# Patient Record
Sex: FEMALE | Race: WHITE | NOT HISPANIC OR LATINO | Employment: OTHER | URBAN - METROPOLITAN AREA
[De-identification: names, ages, dates, MRNs, and addresses within clinical notes are randomized per-mention and may not be internally consistent; named-entity substitution may affect disease eponyms.]

---

## 2017-02-14 ASSESSMENT — MIFFLIN-ST. JEOR: SCORE: 1272.46

## 2017-02-16 ENCOUNTER — SURGERY - HEALTHEAST (OUTPATIENT)
Dept: SURGERY | Facility: CLINIC | Age: 67
End: 2017-02-16

## 2017-02-16 ENCOUNTER — ANESTHESIA - HEALTHEAST (OUTPATIENT)
Dept: SURGERY | Facility: CLINIC | Age: 67
End: 2017-02-16

## 2017-02-16 ASSESSMENT — MIFFLIN-ST. JEOR: SCORE: 1281.53

## 2019-01-07 ENCOUNTER — OFFICE VISIT - RIVER FALLS (OUTPATIENT)
Dept: FAMILY MEDICINE | Facility: CLINIC | Age: 69
End: 2019-01-07

## 2019-04-01 ENCOUNTER — TRANSFERRED RECORDS (OUTPATIENT)
Dept: MULTI SPECIALTY CLINIC | Facility: CLINIC | Age: 69
End: 2019-04-01

## 2019-04-01 ENCOUNTER — OFFICE VISIT - RIVER FALLS (OUTPATIENT)
Dept: FAMILY MEDICINE | Facility: CLINIC | Age: 69
End: 2019-04-01

## 2019-04-01 ASSESSMENT — MIFFLIN-ST. JEOR: SCORE: 1327.45

## 2019-04-25 ENCOUNTER — OFFICE VISIT - RIVER FALLS (OUTPATIENT)
Dept: FAMILY MEDICINE | Facility: CLINIC | Age: 69
End: 2019-04-25

## 2019-11-22 ENCOUNTER — OFFICE VISIT - RIVER FALLS (OUTPATIENT)
Dept: FAMILY MEDICINE | Facility: CLINIC | Age: 69
End: 2019-11-22

## 2019-11-22 ASSESSMENT — MIFFLIN-ST. JEOR: SCORE: 1329.27

## 2019-11-25 ENCOUNTER — OFFICE VISIT - RIVER FALLS (OUTPATIENT)
Dept: FAMILY MEDICINE | Facility: CLINIC | Age: 69
End: 2019-11-25

## 2019-11-25 ENCOUNTER — COMMUNICATION - RIVER FALLS (OUTPATIENT)
Dept: FAMILY MEDICINE | Facility: CLINIC | Age: 69
End: 2019-11-25

## 2019-11-25 LAB
BASOPHILS # BLD MANUAL: 0 10*3/UL
BASOPHILS NFR BLD MANUAL: 0 %
BLASTS NFR BLD MANUAL: 0 %
D DIMER PPP FEU-MCNC: 0.67
ELLIPTOCYTES: NORMAL
EOSINOPHIL # BLD MANUAL: 0 10*3/UL
EOSINOPHIL NFR BLD MANUAL: 1 %
ERYTHROCYTE [DISTWIDTH] IN BLOOD BY AUTOMATED COUNT: 14 % (ref 11.5–15.5)
HCT VFR BLD AUTO: 37.1 % (ref 33–51)
HGB BLD-MCNC: 12.3 G/DL (ref 12–16)
LYMPHOCYTES # BLD MANUAL: 2.3 10*3/UL (ref 0.9–2.9)
LYMPHOCYTES NFR BLD MANUAL: 46 %
Lab: PRESENT
MCH RBC QN AUTO: 30.6 PG (ref 26–34)
MCHC RBC AUTO-ENTMCNC: 33.2 G/DL (ref 32–36)
MCV RBC AUTO: 92 FL (ref 80–100)
METAMYELOCYTES NFR BLD MANUAL: 0 %
MONOCYTES # BLD MANUAL: 0.3 10*3/UL
MONOCYTES NFR BLD MANUAL: 6 %
MYELOCYTES NFR BLD MANUAL: 0 %
NEUTROPHILS # BLD MANUAL: 2.3 10*3/UL (ref 1.7–7)
NEUTROPHILS NFR BLD MANUAL: 47 %
OTHER CELLS NFR BLD MANUAL: 0 %
PLASMA CELLS NFR BLD MANUAL: 0 %
PLATELET # BLD AUTO: 201 10*3/UL (ref 140–440)
PMV BLD: 10.3 FL (ref 6.5–11)
PROMYELOCYTES NFR BLD MANUAL: 0 %
RBC # BLD AUTO: 4.02 10*6/UL (ref 4–5.2)
WAM LARGE PLATELETS - HISTORICAL: PRESENT
WBC # BLD AUTO: 4.9 10*3/UL (ref 4.5–11)

## 2020-03-09 ENCOUNTER — COMMUNICATION - RIVER FALLS (OUTPATIENT)
Dept: FAMILY MEDICINE | Facility: CLINIC | Age: 70
End: 2020-03-09

## 2021-05-30 VITALS — BODY MASS INDEX: 25.74 KG/M2 | HEIGHT: 67 IN | WEIGHT: 164 LBS

## 2021-06-08 NOTE — ANESTHESIA POSTPROCEDURE EVALUATION
Patient: Parris Giraldo  FUSION 2ND AND 3RD TARSOMETATARSAL JOINTS AND EXCISION CYST RIGHT FOOT  Anesthesia type: spinal    Patient location: PACU  Last vitals:   Vitals:    02/16/17 1110   BP: 106/61   Pulse: 62   Resp: 16   Temp:    SpO2: 99%     Post vital signs: stable  Level of consciousness: awake and responds to simple questions  Post-anesthesia pain: pain controlled  Post-anesthesia nausea and vomiting: no  Pulmonary: unassisted, return to baseline  Cardiovascular: stable and blood pressure at baseline  Hydration: adequate  Anesthetic events: no    QCDR Measures:  ASA# 11 - Jillian-op Cardiac Arrest: ASA11B - Patient did NOT experience unanticipated cardiac arrest  ASA# 12 - Jillian-op Mortality Rate: ASA12B - Patient did NOT die  ASA# 13 - PACU Re-Intubation Rate: NA - No ETT / LMA used for case  ASA# 10 - Composite Anes Safety: ASA10A - No serious adverse event  ASA# 38 - New Corneal Injury: ASA38A - No new exposure keratitis or corneal abrasion in PACU    Additional Notes:

## 2021-06-08 NOTE — ANESTHESIA CARE TRANSFER NOTE
Last vitals:   Vitals:    02/16/17 1032   BP: 95/52   Pulse: 73   Resp: 16   Temp: 36.6  C (97.8  F)   SpO2: 97%     Patient's level of consciousness is awake  Spontaneous respirations: yes  Maintains airway independently: yes  Dentition unchanged: yes  Oropharynx: oropharynx clear of all foreign objects    QCDR Measures:  ASA# 20 - Surgical Safety Checklist: ASA20A - Safety Checks Done  PQRS# 430 - Adult PONV Prevention: NA - Not adult patient, not GA or 3 or more risk factors NOT present  ASA# 8 - Peds PONV Prevention: NA - Not pediatric patient, not GA or 2 or more risk factors NOT present  PQRS# 424 - Jillian-op Temp Management: 4559F - At least one body temp DOCUMENTED => 35.5C or 95.9F within required timeframe  PQRS# 426 - PACU Transfer Protocol: - Transfer of care checklist used  ASA# 14 - Acute Post-op Pain: ASA14B - Patient did NOT experience pain >= 7 out of 10    I completed my SBAR handoff to the receiving nurse per policy and procedure.

## 2021-06-08 NOTE — ANESTHESIA PROCEDURE NOTES
Peripheral Block    Patient location during procedure: pre-op  Start time: 2/16/2017 8:34 AM  End time: 2/16/2017 8:40 AM  post-op analgesia per surgeon order as noted in medical record  Staffing:  Performing  Anesthesiologist: HORACE LITTLEJOHN  Preanesthetic Checklist  Completed: patient identified, site marked, risks, benefits, and alternatives discussed, timeout performed, consent obtained, airway assessed, oxygen available, suction available, emergency drugs available and hand hygiene performed  Peripheral Block  Block type: sciatic, popliteal  Prep: ChloraPrep  Patient position: sitting  Patient monitoring: cardiac monitor, continuous pulse oximetry, heart rate and blood pressure  Laterality: right  Injection technique: ultrasound guided    Ultrasound used to visualize needle placement in proximity to nerve being blocked: yes   Permanent ultrasound image captured for medical record    Needle  Needle type: Stimuplex   Needle gauge: 21 G  Needle length: 4 in  no peripheral nerve catheter placed  Assessment  Injection assessment: no difficulty with injection, negative aspiration for heme, no paresthesia on injection, transient paresthesias and incremental injection

## 2021-06-08 NOTE — ANESTHESIA PROCEDURE NOTES
Spinal Block    Patient location during procedure: OR  Start time: 2/16/2017 9:05 AM  End time: 2/16/2017 9:09 AM  Reason for block: primary anesthetic    Staffing:  Performing  Anesthesiologist: HORACE LITTLEJOHN    Preanesthetic Checklist  Completed: patient identified, risks, benefits, and alternatives discussed, timeout performed, consent obtained, airway assessed, oxygen available, suction available, emergency drugs available and hand hygiene performed  Spinal Block  Patient position: sitting  Prep: Betadine  Patient monitoring: heart rate, cardiac monitor, continuous pulse ox and blood pressure  Approach: midline  Location: L2-3  Injection technique: single-shot  Needle type: pencil-tip   Needle gauge: 24 G    Assessment  Sensory level: T10

## 2021-06-08 NOTE — ANESTHESIA PREPROCEDURE EVALUATION
Anesthesia Evaluation      Patient summary reviewed   No history of anesthetic complications     Airway   Mallampati: I  Neck ROM: full   Pulmonary - negative ROS and normal exam                          Cardiovascular - negative ROS and normal exam  Exercise tolerance: > or = 10 METS  (-) murmur  Rhythm: regular  Rate: normal,    no murmur      Neuro/Psych - negative ROS     Endo/Other - negative ROS      GI/Hepatic/Renal - negative ROS           Dental - normal exam                        Anesthesia Plan  Planned anesthetic: spinal and peripheral nerve block    ASA 1     Anesthetic plan and risks discussed with: patient and spouse    Post-op plan: routine recovery

## 2021-12-31 ENCOUNTER — COMMUNICATION - RIVER FALLS (OUTPATIENT)
Dept: FAMILY MEDICINE | Facility: CLINIC | Age: 71
End: 2021-12-31

## 2021-12-31 ENCOUNTER — AMBULATORY - RIVER FALLS (OUTPATIENT)
Dept: FAMILY MEDICINE | Facility: CLINIC | Age: 71
End: 2021-12-31

## 2021-12-31 ENCOUNTER — LAB REQUISITION (OUTPATIENT)
Dept: LAB | Facility: CLINIC | Age: 71
End: 2021-12-31
Payer: MEDICARE

## 2021-12-31 ENCOUNTER — OFFICE VISIT - RIVER FALLS (OUTPATIENT)
Dept: FAMILY MEDICINE | Facility: CLINIC | Age: 71
End: 2021-12-31

## 2021-12-31 DIAGNOSIS — U07.1 COVID-19: ICD-10-CM

## 2021-12-31 PROCEDURE — U0003 INFECTIOUS AGENT DETECTION BY NUCLEIC ACID (DNA OR RNA); SEVERE ACUTE RESPIRATORY SYNDROME CORONAVIRUS 2 (SARS-COV-2) (CORONAVIRUS DISEASE [COVID-19]), AMPLIFIED PROBE TECHNIQUE, MAKING USE OF HIGH THROUGHPUT TECHNOLOGIES AS DESCRIBED BY CMS-2020-01-R: HCPCS | Mod: ORL | Performed by: PHYSICIAN ASSISTANT

## 2021-12-31 PROCEDURE — U0005 INFEC AGEN DETEC AMPLI PROBE: HCPCS | Mod: ORL

## 2022-01-01 LAB — SARS-COV-2 RNA RESP QL NAA+PROBE: POSITIVE

## 2022-01-03 ENCOUNTER — COMMUNICATION - RIVER FALLS (OUTPATIENT)
Dept: FAMILY MEDICINE | Facility: CLINIC | Age: 72
End: 2022-01-03

## 2022-01-04 LAB — SARS-COV-2 RNA RESP QL NAA+PROBE: POSITIVE

## 2022-02-06 ENCOUNTER — HEALTH MAINTENANCE LETTER (OUTPATIENT)
Age: 72
End: 2022-02-06

## 2022-02-11 VITALS
SYSTOLIC BLOOD PRESSURE: 126 MMHG | OXYGEN SATURATION: 96 % | TEMPERATURE: 97.7 F | SYSTOLIC BLOOD PRESSURE: 114 MMHG | HEART RATE: 71 BPM | HEIGHT: 67 IN | DIASTOLIC BLOOD PRESSURE: 72 MMHG | TEMPERATURE: 99.1 F | WEIGHT: 175.4 LBS | HEART RATE: 80 BPM | BODY MASS INDEX: 27.53 KG/M2 | DIASTOLIC BLOOD PRESSURE: 60 MMHG | HEIGHT: 67 IN | BODY MASS INDEX: 27.68 KG/M2

## 2022-02-11 VITALS
SYSTOLIC BLOOD PRESSURE: 120 MMHG | TEMPERATURE: 97.8 F | WEIGHT: 175.6 LBS | DIASTOLIC BLOOD PRESSURE: 58 MMHG | HEART RATE: 64 BPM | OXYGEN SATURATION: 97 % | BODY MASS INDEX: 27.92 KG/M2

## 2022-02-11 VITALS
HEART RATE: 60 BPM | TEMPERATURE: 96.9 F | BODY MASS INDEX: 27.47 KG/M2 | DIASTOLIC BLOOD PRESSURE: 84 MMHG | WEIGHT: 175 LBS | SYSTOLIC BLOOD PRESSURE: 120 MMHG | HEIGHT: 67 IN

## 2022-02-16 NOTE — NURSING NOTE
Comprehensive Intake Entered On:  1/7/2019 4:32 PM CST    Performed On:  1/7/2019 4:26 PM CST by Isis Cantu CMA               Summary   Chief Complaint :   cough x8 days, cough is dry, not able to produce phlegm, HA, no sinus problems, no sore throat, no known fever, taking antibiotic but doesnt seem to be helping   Advance Directive :   No   Menstrual Status :   Postmenopausal   Weight Measured :   175.6 lb(Converted to: 175 lb 10 oz, 79.65 kg)    Systolic Blood Pressure :   120 mmHg   Diastolic Blood Pressure :   58 mmHg (LOW)    Mean Arterial Pressure :   79 mmHg   Peripheral Pulse Rate :   64 bpm   BP Site :   Right arm   BP Method :   Manual   Temperature Tympanic :   97.8 DegF(Converted to: 36.6 DegC)  (LOW)    Oxygen Saturation :   97 %   Race :      Languages :   English   Ethnicity :   Not  or    Isis Cantu CMA - 1/7/2019 4:26 PM CST   Health Status   Allergies Verified? :   Yes   Medication History Verified? :   Yes   Medical History Verified? :   No   Pre-Visit Planning Status :   N/A   Tobacco Use? :   Never smoker   Isis Cantu CMA - 1/7/2019 4:26 PM CST   Meds / Allergies   (As Of: 1/7/2019 4:32:06 PM CST)   Allergies (Active)   No known allergies  Estimated Onset Date:   Unspecified ; Created By:   Joleen Machado; Reaction Status:   Active ; Category:   Drug ; Substance:   No known allergies ; Type:   Allergy ; Updated By:   Joleen Machado; Source:   Paper Chart ; Reviewed Date:   3/4/2016 8:58 AM CST        Medication List   (As Of: 1/7/2019 4:32:06 PM CST)   Home Meds    amoxicillin  :   amoxicillin ; Status:   Documented ; Ordered As Mnemonic:   amoxicillin ; Simple Display Line:   Oral, 0 Refill(s) ; Catalog Code:   amoxicillin ; Order Dt/Tm:   1/7/2019 4:29:29 PM

## 2022-02-16 NOTE — TELEPHONE ENCOUNTER
---------------------  From: RACHEL VIVEROS  To: [facility]  Sent: 03/07/2020 07:32 a.m. CST  Subject: my blood test  I had blood test taken 2 days ago and was told the results would be in my portal yesterday.  its not here!!!---------------------  From: Samantha/Geovanna BRASHER (Phone Messages Pool ([id number]_[facility]))   To: RACHEL VIVEROS    Sent: 3/9/2020 8:18:19 AM CDT  Subject: FW: my blood test     Sourav Nye,    Your results are now in. I have attached a copy of them.    Thanks!

## 2022-02-16 NOTE — PROGRESS NOTES
Chief Complaint    F/u cough.  History of Present Illness      Chief complaint as above reviewed and confirmed with patient.  Pt presents to the clinic with concerns re: cough.  Treated 11-22-19 for cough.  That note reviewed.  On day 4 of zithromax.  Cough does not seem any improved.  Keeps up at night, some coughing spells with sob. Chest discomfort with cough only.  NO wheezing.  Has had blood tinged mucus about 10-12 x in the last 2 days.  Difficult time discribing the mucus but not diony blood and not just streaks of blood.  Fevers low grade, nothing greater than 99.  Concerned because her sister is currently undergoing lung cancer treatment.  No orthopnea, pnd, peripheral edema, weight changes or hx of CHF. No hx of COPD and asthma.  Ongoing mild rhinorrhea, congestion.  Review of Systems      Review of systems is negative with the exception of those noted in HPI          Physical Exam   Vitals & Measurements    T: 97.7   F (Tympanic)  HR: 71(Peripheral)  BP: 114/60  SpO2: 96%     HT: 66.75 in           Vitals as above per nursing documentation           Constitutional : nad appears well          Ears: ears patent B, TMS intact, noninjected           Nose: nasal mucosa is non-ededmatous. no discharge           Throat: pharynx is nonerythematous, no tonsillar hypertrophy, no exudate           Neck: neck supple, no adenopathy, no thyromegaly, no rigidity           Lungs: lungs CTA', no Wheezes, rhonchi or rales           Heart: heart RRR, nl S1, S2 no murmur           skin:  No rashes            CBC WNL      D- dimer negative      CXR: unremarkable.   Assessment/Plan       Cough (R05)         reassured pt, discussed it is very possible she is not feeling better due to illness being viral.  Would finish zithromax however discussed that it is also long acting.  TEssalon for cough, fluids, humidification.  FU for persistent or worsening sx.         Ordered:          CBC w/ Diff/Plt (Request), Priority: Urgent,  Cough  Hemoptysis          D-Dimer (Request), Priority: Urgent, Cough  Hemoptysis          XR Chest PA/Lat (Request), Priority: STAT, Cough  Hemoptysis          XR Chest PA/Lat (Request), Cough  Hemoptysis                Hemoptysis (R04.2)         Ordered:          CBC w/ Diff/Plt (Request), Priority: Urgent, Cough  Hemoptysis          D-Dimer (Request), Priority: Urgent, Cough  Hemoptysis          XR Chest PA/Lat (Request), Priority: STAT, Cough  Hemoptysis          XR Chest PA/Lat (Request), Cough  Hemoptysis                Orders:         benzonatate, = 2 cap(s) ( 200 mg ), Oral, tid, x 7 day(s), # 42 cap(s), 1 Refill(s), Type: Acute, Pharmacy: [facility] , 2 cap(s) Oral tid,x7 day(s), (Ordered)  Patient Information     Name:RACHEL VIVEROS      Address:      [address] [id number]     Sex:Female     YOB: 1950     [phone]     Emergency Contact:PEPE VIVEROS     MRN:[id number]     FIN:[id number]     Location:[facility]     Date of Service:11/25/2019      Primary Care Physician:       Martin Vu MDrey, [phone]      Attending Physician:       Sandra Aviles PA-C, [phone]  Problem List/Past Medical History    Ongoing     Disorder of bone, unspecified     Dyslipidemia     Family history of kidney cancer     Full incontinence of feces       Comments: Referral / pelvic floor disorder.     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 Roof of mouth.     Osteoarthritis of knee, unspecified       Comments: Evaluated by Dwight Bianchi MD, on 08/26/2008. Bilateral     Rheumatic Fever       Comments: childhood     SCARLET FEVER       Comments: childhood     Vaginal dryness, menopausal     Varicose veins    Historical     *Hospitalized - TBI     ASCUS on PAP Smear       Comments: Okay on repeat Pap.     Foot neuroma        Comments: Removed from right foot.     Pregnancy     Pregnancy  Procedure/Surgical History     InterStim lead and modulator placement (03/12/2014)           Colonoscopy (03/19/2013)            Comments: Patient had poor prep and should repeat in 5 years with MAC sedation. She should have 2 days of clear lliquids instead of the one day before..     DEXA - Dual energy X-ray photon absorptiometry (02/28/2013)            Comments: Stable mild-mod osteopenia. OK to repeat 5 yrs. (Due 2018).     Rectal prolapse surgery (07/19/2010)            Comments: Dr. Bradshaw..     DEXA - Dual energy X-ray photon absorptiometry (12/14/2009)            Comments: Stable osteopenia. Repeat 3-5 yrs..     Excision of vaginal lesion (11/26/2008)           Posterior repair with prolift (09/03/2008)            Comments: Symptomatic rectocele and enterocele..     Colonoscopy (08/18/2008)           Exercise stress test (11/08/2006)            Comments: Indication:  Neck tightness.  Maternal history of CHF..     Bone density scan (04/21/2006)           Neuroma (10.2003)            Comments: Removed from left foot..     Colonoscopy (2002)            Comments: Repeat 10 yrs. (Due 2012). normal.     Hysterectomy (1997)            Comments: Ovaries remain in place..     Adenoidectomy           Appendectomy           Tonsillectomy        Medications    calcium (as carbonate) 600 mg oral tablet, 1200 mg= 2 tab(s), Oral, daily    Omega-3 oral capsule    Tessalon Perles 100 mg oral capsule, 200 mg= 2 cap(s), Oral, tid, 1 refills    Vitamin D with Minerals oral tablet, 1 tab(s), Oral, daily    Zithromax 250 mg oral tablet, 1 packet(s), Oral, once  Allergies    No Known Medication Allergies  Social History    Smoking Status - 11/25/2019     Never smoker     Alcohol - Denies Alcohol Use, 12/06/2010      Never, 03/04/2016     Employment/School     Exercise - Regular exercise, 12/15/2011      Exercise type: Yoga., 12/06/2010      Exercise frequency: 3  times/week., 12/06/2010     Home/Environment      Marital status:  (Living together). 2 children. Living situation: Home/Independent., 05/02/2019     Substance Abuse - Denies Substance Abuse, 12/15/2011      Never, 03/04/2016     Tobacco      Never, 06/19/2014  Family History    Bone cancer.: Father.    CA - Cancer: Father and Sister.    CA - Cancer of colon: Father.    CHF - Congestive heart failure: Mother.    Cancer: Father and Sister.    Congestive Heart Failure: Mother.    Diabetes: Grandmother (P).    Diabetes mellitus: Grandmother (P).Negative: Grandfather (M).    Kidney cancer, primary, with metastasis from kidney to other site.: Father and Sister.    Kidney failure.: Father.    Daughter: History is negative    Brother: History is negative    Brother: History is negative    Sister: History is negative  Immunizations      Vaccine Date Status      influenza virus vaccine, inactivated 03/04/2016 Given      tetanus/diphth/pertuss (Tdap) adult/adol 03/04/2016 Recorded      influenza 10/17/2015 Recorded      pneumococcal (PCV13) 03/03/2015 Given      pneumococcal (PPSV23) 02/27/2014 Given      influenza virus vaccine, inactivated 12/24/2013 Given      influenza virus vaccine, inactivated 12/31/2008 Recorded      influenza 12/01/2008 Recorded      ZOS, shingles 11/19/2007 Recorded      Td 04/07/2006 Recorded  Lab Results       Lab Results (Last 4 results within 90 days)        WBC: 4.9 [4.5  - 11] (11/25/19 14:13:00)       RBC: 4.02 [4  - 5.2] (11/25/19 14:13:00)       Hgb: 12.3 [12 g/dL - 16 g/dL] (11/25/19 14:13:00)       Hct: 37.1 [33 % - 51 %] (11/25/19 14:13:00)       MCV: 92 [80 fL - 100 fL] (11/25/19 14:13:00)       MCH: 30.6 [26 pg - 34 pg] (11/25/19 14:13:00)       MCHC: 33.2 [32 g/dL - 36 g/dL] (11/25/19 14:13:00)       RDW: 14.0 [11.5 % - 15.5 %] (11/25/19 14:13:00)       Platelet: 201 [140  - 440] (11/25/19 14:13:00)       MPV: 10.3 [6.5 fL - 11 fL] (11/25/19 14:13:00)       Metamyelocytes Man:  0.0 (11/25/19 14:13:00)       Myelocytes Man: 0.0 (11/25/19 14:13:00)       Promyelocytes Man: 0.0 (11/25/19 14:13:00)       Blasts Man: 0.0 (11/25/19 14:13:00)       Other Cells Man: 0.0 (11/25/19 14:13:00)       Lymphocytes: 46.0 (11/25/19 14:13:00)       Abs Lymphocytes: 2.3 [0.9  - 2.9] (11/25/19 14:13:00)       Neutrophils: 47.0 (11/25/19 14:13:00)       Abs Neutrophils: 2.3 [1.7  - 7] (11/25/19 14:13:00)       Monocytes: 6.0 (11/25/19 14:13:00)       Abs Monocytes: 0.3 (11/25/19 14:13:00)       Eosinophils: 1.0 (11/25/19 14:13:00)       Abs Eosinophils: 0.0 (11/25/19 14:13:00)       Basophils: 0.0 (11/25/19 14:13:00)       Abs Basophils: 0.0 (11/25/19 14:13:00)       Plasma Cells: 0.0 (11/25/19 14:13:00)       Platelet Estimate: Adequate (11/25/19 14:13:00)       Vacuolated Granulocytes: Present (11/25/19 14:13:00)       Elliptocytes: Few (11/25/19 14:13:00)       RBC Comments: Present (11/25/19 14:13:00)       Plt Large: Present (11/25/19 14:13:00)       D-Dimer: 0.67 (11/25/19 14:13:00)

## 2022-02-16 NOTE — NURSING NOTE
Comprehensive Intake Entered On:  11/25/2019 1:41 PM CST    Performed On:  11/25/2019 1:37 PM CST by Nia Mukherjee               Summary   Chief Complaint :   F/u cough.    Advance Directive :   No   Menstrual Status :   Postmenopausal   Height Measured :   66.75 in(Converted to: 5 ft 7 in, 169.54 cm)    Ht/Wt Measurement Refused by Patient? :   Yes   Systolic Blood Pressure :   114 mmHg   Diastolic Blood Pressure :   60 mmHg   Mean Arterial Pressure :   78 mmHg   Peripheral Pulse Rate :   71 bpm   Temperature Tympanic :   97.7 DegF(Converted to: 36.5 DegC)  (LOW)    Oxygen Saturation :   96 %   Race :      Languages :   English   Ethnicity :   Not  or    Nia Mukherjee - 11/25/2019 1:37 PM CST   Health Status   Allergies Verified? :   Yes   Medication History Verified? :   Yes   Medical History Verified? :   Yes   Pre-Visit Planning Status :   Completed   Tobacco Use? :   Never smoker   Nia Mukherjee - 11/25/2019 1:37 PM CST   Meds / Allergies   (As Of: 11/25/2019 1:41:29 PM CST)   Allergies (Active)   No Known Medication Allergies  Estimated Onset Date:   Unspecified ; Created By:   Callie Valdivia CMA; Reaction Status:   Active ; Category:   Drug ; Substance:   No Known Medication Allergies ; Type:   Allergy ; Updated By:   Callie Valdivia CMA; Reviewed Date:   11/25/2019 1:39 PM CST        Medication List   (As Of: 11/25/2019 1:41:29 PM CST)   Prescription/Discharge Order    azithromycin  :   azithromycin ; Status:   Prescribed ; Ordered As Mnemonic:   Zithromax 250 mg oral tablet ; Simple Display Line:   1 packet(s), PO, Once, as directed on package labeling. Two tablets po on day one, then one tablet daily x four days, 6 tab(s), 0 Refill(s) ; Ordering Provider:   Donte Rai PA-C; Catalog Code:   azithromycin ; Order Dt/Tm:   11/22/2019 2:37:24 PM CST            Home Meds    calcium carbonate  :   calcium carbonate ; Status:   Documented ; Ordered As Mnemonic:    calcium (as carbonate) 600 mg oral tablet ; Simple Display Line:   1,200 mg, 2 tab(s), Oral, daily, 0 Refill(s) ; Catalog Code:   calcium carbonate ; Order Dt/Tm:   4/1/2019 10:06:19 AM CDT          multivitamin with minerals  :   multivitamin with minerals ; Status:   Documented ; Ordered As Mnemonic:   Vitamin D with Minerals oral tablet ; Simple Display Line:   1 tab(s), Oral, daily, 0 Refill(s) ; Catalog Code:   multivitamin with minerals ; Order Dt/Tm:   4/1/2019 10:06:08 AM CDT          omega-3 polyunsaturated fatty acids  :   omega-3 polyunsaturated fatty acids ; Status:   Documented ; Ordered As Mnemonic:   Omega-3 oral capsule ; Simple Display Line:   0 Refill(s) ; Catalog Code:   omega-3 polyunsaturated fatty acids ; Order Dt/Tm:   4/1/2019 10:06:02 AM CDT

## 2022-02-16 NOTE — TELEPHONE ENCOUNTER
"---------------------From: Susana Pederson CMA (Phone Messages Pool ([address] [place] [address])) To: [facility] Message Pool ([address] [place] [address]);   Sent: 11/25/2019 9:35:28 AM CSTSubject: General Message-worsening cough Phone MessagePCP:   [employer] Time of Call:  0850   Person Calling:  selfPhone number:  [phone]:   pt seen Friday by CARLA for a \"horrible cough\", states now much deeper and keeping her up at night, is coughing up tinges of blood and woke up 4times last night with coughing, no energy.  CARLA rx'd Z-eduardo for bronchitis on Friday.  Wondering if she should be rechecked, wait it out more, change rx??  Please adviseLast office visit and reason:  11/22---------------------From: Xochitl Flores LPN ([facility] Message Pool ([address] [place] [address])) To: Joey Vu MD;   Sent: 11/25/2019 10:10:06 AM CSTSubject: FW: General Message-worsening cough---------------------From: Joey Vu MD To: [employer] Message Pool ([address] [place] [address]);   Sent: 11/25/2019 10:50:30 AM CSTSubject: RE: General Message-worsening cough Needs to be seen.Called pt and informed of message. transferred to scheduling  "

## 2022-02-16 NOTE — TELEPHONE ENCOUNTER
---------------------  From: Kinsey Singh CMA (Phone Messages Pool ([id number]_[facility]))   To: [facility] Message Pool ([address]_[place] [facility] [place]);     Sent: 10/7/2019 4:49:00 PM CDT  Subject: General Message     Phone Message    PCP:   ERNESTO      Time of Call:  4:17       Person Calling:  Patient  Phone number:  [phone]    Note:  Patient is traveling has has broke out with poison ivy.    Is it possible to get a prescription?    [facility]  [address] [place] [address]    [phone]      Transferred to: ERNESTO---------------------  From: Geovanna Villalpando ([facility] Message Pool ([id number]_[place] [facility] [place]))   To: Joey Vu MD;     Sent: 10/7/2019 4:51:13 PM CDT  Subject: FW: General Message

## 2022-02-16 NOTE — LETTER
(Inserted Image. Unable to display)   July 10, 2020      RACHEL VIVEROS   [address] [place], [place] [id number]        Dear RACHEL,      Thank you for selecting [facility] (previously [facility]) for your healthcare needs.     Our records indicate you are due for the following services:     Annual Physical    To schedule an appointment or if you have further questions, please contact your primary clinic:   [facility]          [phone]   [facility]    [phone]             [facility]         [phone]      Powered by [employer]    Sincerely,    Rashad Carrington M.D.

## 2022-02-16 NOTE — PROGRESS NOTES
Patient:   RACHEL VIVEROS            MRN: [id number]            FIN: [id number]               Age:   69 years     Sex:  Female     :  1950   Associated Diagnoses:   Osteopenia; Medicare annual wellness visit, subsequent   Author:   Rashad Carrington MD      Visit Information      Primary Care Provider (PCP):  Joey Vu MD# [id number]      Chief Complaint   2019 10:04 AM CDT    AWV   Here for annual wellness physical for Medicare      History of Present Illness     Current medical providers reviewed with patient updated on demographics in chart as listed on the patient health history form.  Patient notes that she usually goes to [facility] but was told that AWV was not an option there. Previously had followed with Yessy Velez at our [place] site. Generally is very healthy. No chronic medications. No current concerns.  Depression screening completed and history reviewed with patient, no concerns.  CAGE reviewed with patient, no concerns.  Functional ability/Health Risk assessment reviewed:   Hearing impairment - denies concerns   Activities of daily living - independent without concerns   Fall risks - denies falls in past year, no assistance required with walking or transfer   Home safety issues reviewed, no concerns raised.  Cognitive impairment evaluated, patient oriented to year, date, location, self.  No deficits notes.  Cognitive screening and dementia symptoms reviewed.  Advanced care planning discussed.  Patient is welcome to provide paperwork to us so that we can copy into electronic medical record.  Preventive services reviewed and documented on preventive services checklist.  Past history reviewed and confirmed as noted below.       Review of Systems   ROS reviewed as documented in chart      Health Status   Allergies:    Allergic Reactions (Selected)  No Known Medication Allergies   Medications:  (Selected)   Documented Medications  Documented  Omega-3 oral capsule: 0  Refill(s), Type: Maintenance  Vitamin D with Minerals oral tablet: 1 tab(s), Oral, daily, 0 Refill(s), Type: Maintenance  calcium (as carbonate) 600 mg oral tablet: = 2 tab(s) ( 1,200 mg ), Oral, daily, 0 Refill(s), Type: Maintenance   Problem list:    All Problems  Dyslipidemia / ICD-9-.4 / Confirmed  Family history of kidney cancer / ICD-9-CM V16.51 / Confirmed  Fecal incontinence / ICD-9-.6 / Confirmed  Varicose veins / SNOMED CT [id number] / Confirmed  History of traumatic brain injury / SNOMED CT 2031003697 / Confirmed  OA (Osteoarthritis) of Knee / ICD-9-.96 / Confirmed  Osteopenia / ICD-9-.90 / Confirmed  Vaginal dryness, menopausal / SNOMED CT 63641891 / Confirmed  Inactive: Leukoplakia / SNOMED CT 30353295  Inactive: Rheumatic Fever / ICD-9-  Inactive: SCARLET FEVER / ICD-9-.1  Resolved: *Hospitalized - TBI  Resolved: ASCUS on PAP Smear / ICD-9-.01  Resolved: Foot neuroma / ICD-9-.3      Histories   Past Medical History:    Active  History of traumatic brain injury (SNOMED CT 7992310317): Onset on 7/23/2016 at 66 years.  Fecal incontinence (ICD-9-.6): Onset in 2004 at 53 years.  Comments:  2/21/2013 CST 11:45 AM Susana Diaz  Referral / pelvic floor disorder.  Osteopenia (ICD-9-.90): Onset in 2003 at 52 years.  Dyslipidemia (ICD-9-.4)  OA (Osteoarthritis) of Knee (ICD-9-.96)  Comments:  2/21/2013 CST 11:50 AM Susana Diaz  Bilateral    2/21/2013 CST 11:58 AM Susana Diaz  Evaluated by Dwight Bianchi MD, on 08/26/2008.  Resolved  *Hospitalized - TBI: Onset on 7/23/2016 at 66 years.  Resolved on 7/24/2016 at 66 years.  Foot neuroma (ICD-9-.3): Onset in 2003 at 52 years.  Resolved.  Comments:  2/21/2013 CST 11:41 AM CST - Susana Eaton  Removed from right foot.  ASCUS on PAP Smear (ICD-9-.01): Onset in the month of 2/2001 at 50 years  Resolved.  Comments:  2/21/2013 CST 11:43 AM CST -  Susana Eaton  Okay on repeat Pap.   Family History:    Diabetes mellitus  Grandmother (P)  CA - Cancer  Father (Sander, )  Sister  CA - Cancer of colon  Father (Sander, )  CHF - Congestive heart failure  Mother (Tammi, )  Cancer  Sister  Comments:  2013 11:37 AM Susana Diaz  One sister has CA of kidney.  Father (Sander, )  Comments:  2013 11:37 AM Susana Diaz  CA bones / colon  Diabetes  Grandmother (P)  Comments:  2013 11:38 AM Susana Diaz  DM  Congestive Heart Failure  Mother (Tammi, )  Bone cancer.  Father (Sander, )  Kidney failure.  Father (Sander, )  Kidney cancer, primary, with metastasis from kidney to other site.  Father (Sander, )  Sister  Comments:  3/26/2013 6:09 PM CDT - Dora Joseph CMA  1 kidney removed at age 53     Procedure history:    InterStim lead and modulator placement on 3/12/2014 at 64 Years.  Colonoscopy ([id number]) on 3/19/2013 at 63 Years.  Comments:  3/28/2013 3:19 PM CDT - Tesfaye Ortiz MA  Patient had poor prep and should repeat in 5 years with MAC sedation. She should have 2 days of clear lliquids instead of the one day before.  DEXA - Dual energy X-ray photon absorptiometry (523254291) on 2013 at 63 Years.  Comments:  3/19/2013 11:48 AM IKER - Yessy Reza  Stable mild-mod osteopenia. OK to repeat 5 yrs. (Due 2018)  Rectal prolapse surgery on 2010 at 60 Years.  Comments:  10/28/2010 4:27 PM JOHNSONT - Yessy Reza Dr..  DEXA - Dual energy X-ray photon absorptiometry (731310328) on 2009 at 59 Years.  Comments:  2010 8:37 AM Yessy South  Stable osteopenia. Repeat 3-5 yrs.  Excision of vaginal lesion on 2008 at 58 Years.  Posterior repair with prolift on 9/3/2008 at 58 Years.  Comments:  2013 11:56 AM MIAN Eaton Susana  Symptomatic rectocele and enterocele.  Colonoscopy ([id number]) on 2008 at 58 Years.  Exercise  stress test (476240062) on 2006 at 56 Years.  Comments:  2013 12:02 PM Susana Diaz  Indication:  Neck tightness.  Maternal history of CHF.  Bone density scan (703530479) on 2006 at 56 Years.  Neuroma (215.9) in the month of 10/2003 at 53 Years.  Comments:  2013 11:40 AM Susana Diaz  Removed from left foot.  Colonoscopy ([id number]) in  at 52 Years.  Comments:  2010 8:36 AM CST - Monisha JIMÉNEZ-C, Yessy  Repeat 10 yrs. (Due )    2010 3:38 PM CDT - Joleen Machado  normal  Hysterectomy in  at 47 Years.  Comments:  2013 11:51 AM Susana Diaz  Ovaries remain in place.  Tonsillectomy (850750392).  Adenoidectomy (755412613).  Appendectomy (028613743).   x2.  Comments:  2013 11:39 AM Susana Diaz  1968 and 1970   Social History:        Alcohol Assessment: Denies Alcohol Use            Never      Tobacco Assessment            Never      Substance Abuse Assessment: Denies Substance Abuse            Never      Employment and Education Assessment                     Comments:                      2013 - Dora Joseph CMA                     Homemaker      Home and Environment Assessment            Marital status:  (Living together).  2 children.                     Comments:                      12/15/2011 - Dora Joseph CMA                      - Josep White      Exercise and Physical Activity Assessment: Regular exercise            Exercise type: Yoga.            Exercise frequency: 3 times/week.                     Comments:                      2010 - Dora Joseph CMA                     Strength classes 2x/wk      Physical Examination   Vital Signs   2019 10:04 AM CDT Temperature Tympanic 96.9 DegF  LOW    Peripheral Pulse Rate 60 bpm    Pulse Site Radial artery    HR Method Manual    Systolic Blood Pressure 120 mmHg    Diastolic Blood Pressure 84 mmHg  HI    Mean Arterial Pressure 96 mmHg    BP Site Right arm     BP Method Manual      Measurements from flowsheet : Measurements   4/1/2019 10:04 AM CDT Height Measured - Standard 66.75 in    Weight Measured - Standard 175 lb    BSA 1.93 m2    Body Mass Index 27.61 kg/m2  HI      General:  Alert and oriented, No acute distress.    Eye:  Pupils are equal, round and reactive to light, Extraocular movements are intact, Normal conjunctiva.    HENT:  Normocephalic, Tympanic membranes are clear, Oral mucosa is moist, No pharyngeal erythema.    Neck:  Supple, Non-tender, No lymphadenopathy, No thyromegaly.    Respiratory:  Lungs are clear to auscultation.    Cardiovascular:  Normal rate, Regular rhythm.    Breast:  No mass, No tenderness, No discharge.    Gastrointestinal:  Soft, Non-tender, Non-distended, No organomegaly.    Musculoskeletal:  Normal range of motion, Normal strength.    Integumentary:  Warm, Dry, Pink, No rash, no concerning lesions.    Neurologic:  Alert, Oriented, Normal sensory.    Psychiatric:  Cooperative, Appropriate mood & affect.       Impression and Plan   Diagnosis     Osteopenia (PQN43-KH M85.80).     Medicare annual wellness visit, subsequent (ZKT43-GF Z00.00).     Course:  Progressing as expected.    Plan:  had recent DEXA, will request her [place] records for us to have going forward, follow up with us in one year, sooner as needed. Doing DALT fasting labs tomorrow and will bring us a copy for her chart. No other concerns at this time..    Patient Instructions:       Counseled: Patient, Regarding diagnosis, Regarding treatment, Regarding medications, Diet, Activity, Prognosis/ end-of-life issues, BMI, exercise, healthy eating, home safety, depression.    Summary:  Preventive services recommended as noted on preventive services checklist..

## 2022-02-16 NOTE — PROGRESS NOTES
Patient:   RACHEL VIVEROS            MRN: [id number]            FIN: [id number]               Age:   68 years     Sex:  Female     :  1950   Associated Diagnoses:   Bronchitis   Author:   Yamila Stern      Visit Information      Date of Service: 2019 04:20 pm  Performing Location: [facility]  Encounter#: [id number]      Primary Care Provider (PCP):  Joey Vu MD    NPI# [id number]      Referring Provider:  Yamila Stern    NPI# [id number]      Chief Complaint   2019 4:26 PM CST     cough x8 days, cough is dry, not able to produce phlegm, HA, no sinus problems, no sore throat, no known fever, taking antibiotic but doesnt seem to be helping        History of Present Illness   reviewed presenting problem as above with patient  She generally is very healthy--no hx lung disease or pneumonia or asthma. Is a non smoker  She had someone prescribe Amoxicillin to her for this severe cough, has had about 4 days, not helping, was not evaluated.  She has no SOB or fever or associated symtpoms, just the cough  She used Mucinex and Robitussin and cough kept her up all night      Review of Systems   Constitutional:  No fever, No chills.    Ear/Nose/Mouth/Throat:  No ear pain, No nasal congestion, No sore throat.    Respiratory:  No shortness of breath, No wheezing.    Gastrointestinal:  No nausea, No vomiting, No diarrhea.              Health Status   Allergies:    Allergic Reactions (Selected)  No known allergies   Medications:  (Selected)   Prescriptions  Prescribed  codeine-guaifenesin 10 mg-100 mg/5 mL oral syrup: 5 mL, PO, q6 hrs, Instructions: not to exceed 12 teaspoons/day, PRN: for cough, # 240 mL, 0 Refill(s), Type: Maintenance, Pharmacy: [id number], 5 mL Oral q6 hrs,x5 day(s),PRN:for cough,Instr:not to exceed 12 teaspoons/day  doxycycline monohydrate 100 mg oral capsule: = 1 cap(s) ( 100 mg ), Oral, bid, # 20 cap(s), 0 Refill(s), Type: Maintenance, Pharmacy:  [id number], 1 cap(s) Oral bid,x10 day(s)  predniSONE 20 mg oral tablet: = 1 tab(s) ( 20 mg ), PO, bid, Instructions: with food or milk, second dose before 3 pm, # 10 tab(s), 0 Refill(s), Type: Maintenance, Pharmacy: [id number], 1 tab(s) Oral bid,x5 day(s),Instr:with food or milk, second dose before 3 pm  Documented Medications  Documented  amoxicillin: Oral, 0 Refill(s), Type: Maintenance   Problem list:    All Problems  Dyslipidemia / ICD-9-.4 / Confirmed  Osteopenia / ICD-9-.90 / Confirmed  Fecal incontinence / ICD-9-.6 / Confirmed  Referral / pelvic floor disorder.  OA (Osteoarthritis) of Knee / ICD-9-.96 / Confirmed  Bilateral  Evaluated by Dwight Bianchi MD, on 08/26/2008.  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  Roof of mouth.  Resolved: Foot neuroma / ICD-9-.3  Removed from right foot.  Resolved: ASCUS on PAP Smear / ICD-9-.01  Okay on repeat Pap.  Resolved: *Hospitalized - TBI      Histories   Past Medical History:    Active  History of traumatic brain injury (4004391706): Onset on 7/23/2016 at 66 years.  Fecal incontinence (787.6): Onset in 2004 at 53 years.  Comments:  2/21/2013 CST 11:45 AM CST - Susana Eaton  Referral / pelvic floor disorder.  Osteopenia (733.90): Onset in 2003 at 52 years.  Dyslipidemia (272.4)  OA (Osteoarthritis) of Knee (715.96)  Comments:  2/21/2013 CST 11:50 AM Susana Diaz  Bilateral    2/21/2013 CST 11:58 AM Susana Diaz  Evaluated by Dwight Bianchi MD, on  2008.  Resolved  *Hospitalized - TBI: Onset on 2016 at 66 years.  Resolved on 2016 at 66 years.  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  Resolved.  Comments:  2013 CST 11:41 AM MIAN Eaton Susana  Removed from right foot.  ASCUS on PAP Smear (795.01): Onset in the month of 2001 at 50 years  Resolved.  Comments:  2013 CST 11:43 AM Susana Diaz  Okay on repeat Pap.   Family History:    Diabetes mellitus  Grandmother (P)  CA - Cancer  Father (Sander, )  Sister  CA - Cancer of colon  Father (Sander, )  CHF - Congestive heart failure  Mother (Tammi, )  Cancer  Sister  Comments:  2013 11:37 AM Susana Diaz  One sister has CA of kidney.  Father (Sander, )  Comments:  2013 11:37 AM Susana Diaz  CA bones / colon  Diabetes  Grandmother (P)  Comments:  2013 11:38 AM Susana Diaz  DM  Congestive Heart Failure  Mother (Tammi, )  Bone cancer.  Father (Sander, )  Kidney failure.  Father (Sander, )  Kidney cancer, primary, with metastasis from kidney to other site.  Father (Sander, )  Sister  Comments:  3/26/2013 6:09 PM CDT - Dora Joseph CMA  1 kidney removed at age 53     Procedure history:    InterStim lead and modulator placement performed by Kevin Brewer DO on 3/12/2014 at 64 Years.  Colonoscopy (SNOMED CT 796612770) performed by Dheeraj Howard MD on 3/19/2013 at 63 Years.  Comments:  3/28/2013 3:19 PM CDT - Tesfaye Ortiz MA  Patient had poor prep and should repeat in 5 years with MAC sedation. She should have 2 days of clear lliquids instead of the one day before.  DEXA - Dual energy X-ray photon absorptiometry (SNOMED CT 214445121) on 2013 at 63 Years.  Comments:  3/19/2013 11:48 AM CDT - Yessy Reza  Stable mild-mod osteopenia. OK to repeat 5 yrs. (Due 2018)  Rectal prolapse surgery on 2010 at 60 Years.  Comments:  10/28/2010 4:27 PM IKER - Monisha WISEMAN,  Yessy Bradshaw.  DEXA - Dual energy X-ray photon absorptiometry (SNOMED CT 900640733) on 2009 at 59 Years.  Comments:  2010 8:37 AM Yessy South  Stable osteopenia. Repeat 3-5 yrs.  Excision of vaginal lesion on 2008 at 58 Years.  Posterior repair with prolift on 9/3/2008 at 58 Years.  Comments:  2013 11:56 AM Susana Diaz  Symptomatic rectocele and enterocele.  Colonoscopy (SNOMED CT 117787375) performed by Dheeraj Howard MD on 2008 at 58 Years.  Exercise stress test (SNOMED CT 686632127) on 2006 at 56 Years.  Comments:  2013 12:02 PM Susana Diaz  Indication:  Neck tightness.  Maternal history of CHF.  Bone density scan (SNOMED CT 558281630) on 2006 at 56 Years.  Neuroma (ICD-9-.9) in the month of 10/2003 at 53 Years.  Comments:  2013 11:40 AM Susana Diaz  Removed from left foot.  Colonoscopy (SNOMED CT 316355187) in  at 52 Years.  Comments:  2010 8:36 AM Yessy South  Repeat 10 yrs. (Due )    2010 3:38 PM CDT - Joleen Machado  normal  Hysterectomy in  at 47 Years.  Comments:  2013 11:51 AM Susana Diaz  Ovaries remain in place.  Tonsillectomy (SNOMED CT 092567556).  Adenoidectomy (SNOMED CT 023865032).  Appendectomy (SNOMED CT 578159878).   x2.  Comments:  2013 11:39 AM Susana Diaz  1968 and 1970   Social History:        Alcohol Assessment: Denies Alcohol Use            Never      Tobacco Assessment            Never      Substance Abuse Assessment: Denies Substance Abuse            Never      Employment and Education Assessment                     Comments:                      2013 - Dora Joseph CMA                     Homemaker      Home and Environment Assessment            Marital status:  (Living together).  2 children.                     Comments:                      12/15/2011 - Dora Joseph CMA                      - Josep  Enzo      Exercise and Physical Activity Assessment: Regular exercise            Exercise type: Yoga.            Exercise frequency: 3 times/week.                     Comments:                      12/06/2010 - JakeDora wolff CMA                     Strength classes 2x/wk      Physical Examination   Vital Signs   1/7/2019 4:26 PM CST Temperature Tympanic 97.8 DegF  LOW    Peripheral Pulse Rate 64 bpm    Systolic Blood Pressure 120 mmHg    Diastolic Blood Pressure 58 mmHg  LOW    Mean Arterial Pressure 79 mmHg    BP Site Right arm    BP Method Manual    Oxygen Saturation 97 %      Measurements from flowsheet : Measurements   1/7/2019 4:26 PM CST     Weight Measured - Standard                175.6 lb     General:  Alert and oriented, No acute distress.    Eye:  Normal conjunctiva.    HENT:  Tympanic membranes are clear, Normal hearing, Oral mucosa is moist, No pharyngeal erythema, No sinus tenderness.    Neck:  Supple, Non-tender, No lymphadenopathy.    Respiratory:  Respirations are non-labored, Symmetrical chest wall expansion, course breath sounds bilat with some wheezes as well  harsh barking cough.    Cardiovascular:  Normal rate, Regular rhythm, No murmur.    Musculoskeletal:  Normal range of motion, Normal gait.    Integumentary:  Warm, Dry, Pink, No rash.    Neurologic:  Alert, Oriented.    Psychiatric:  Cooperative.       Review / Management   Radiology results   X-ray, X-ray reviewed with patient, no abnormality noted.  Will await radiology over-read and if differs such that treatment would be altered,  will notify patient.    Course:  increased air movement after albuterol neb.       Impression and Plan   Diagnosis     Bronchitis (CAN79-UP J40).     Patient Instructions:       Counseled: Patient, Regarding diagnosis, Regarding treatment, Regarding medications, Verbalized understanding, Counseled on symptomatic management. Return to clinic for re evaluation if worsening, simply not improving, or failure to  resolve.   .    Orders     Orders (Selected)   Prescriptions  Prescribed  codeine-guaifenesin 10 mg-100 mg/5 mL oral syrup: 5 mL, PO, q6 hrs, Instructions: not to exceed 12 teaspoons/day, PRN: for cough, # 240 mL, 0 Refill(s), Type: Maintenance, Pharmacy: [id number], 5 mL Oral q6 hrs,x5 day(s),PRN:for cough,Instr:not to exceed 12 teaspoons/day  doxycycline monohydrate 100 mg oral capsule: = 1 cap(s) ( 100 mg ), Oral, bid, # 20 cap(s), 0 Refill(s), Type: Maintenance, Pharmacy: [id number], 1 cap(s) Oral bid,x10 day(s)  predniSONE 20 mg oral tablet: = 1 tab(s) ( 20 mg ), PO, bid, Instructions: with food or milk, second dose before 3 pm, # 10 tab(s), 0 Refill(s), Type: Maintenance, Pharmacy: [id number], 1 tab(s) Oral bid,x5 day(s),Instr:with food or milk, second dose before 3 pm.

## 2022-02-16 NOTE — NURSING NOTE
Comprehensive Intake Entered On:  11/22/2019 2:24 PM CST    Performed On:  11/22/2019 2:20 PM CST by Radha Dias MA               Summary   Chief Complaint :   Cough, congestion, body aches x 3 days    Advance Directive :   No   Menstrual Status :   Postmenopausal   Weight Measured :   175.4 lb(Converted to: 175 lb 6 oz, 79.56 kg)    Height Measured :   66.75 in(Converted to: 5 ft 7 in, 169.54 cm)    Body Mass Index :   27.67 kg/m2 (HI)    Body Surface Area :   1.93 m2   Systolic Blood Pressure :   126 mmHg   Diastolic Blood Pressure :   72 mmHg   Mean Arterial Pressure :   90 mmHg   Peripheral Pulse Rate :   80 bpm   BP Site :   Left arm   Pulse Site :   Radial artery   BP Method :   Manual   HR Method :   Manual   Temperature Tympanic :   99.1 DegF(Converted to: 37.3 DegC)    Race :      Languages :   English   Ethnicity :   Not  or    Radha Dias MA - 11/22/2019 2:20 PM CST   Health Status   Allergies Verified? :   Yes   Medication History Verified? :   Yes   Medical History Verified? :   Yes   Pre-Visit Planning Status :   Completed   Tobacco Use? :   Never smoker   Radha Dias MA - 11/22/2019 2:20 PM CST   Consents   Consent for Immunization Exchange :   Consent Granted   Consent for Immunizations to Providers :   Consent Granted   Radha Dias MA - 11/22/2019 2:20 PM CST   Meds / Allergies   (As Of: 11/22/2019 2:24:20 PM CST)   Allergies (Active)   No Known Medication Allergies  Estimated Onset Date:   Unspecified ; Created By:   Callie Valdivia CMA; Reaction Status:   Active ; Category:   Drug ; Substance:   No Known Medication Allergies ; Type:   Allergy ; Updated By:   Callie Valdivia CMA; Reviewed Date:   11/22/2019 2:22 PM CST        Medication List   (As Of: 11/22/2019 2:24:20 PM CST)   Home Meds    calcium carbonate  :   calcium carbonate ; Status:   Documented ; Ordered As Mnemonic:   calcium (as carbonate) 600 mg oral tablet ; Simple Display Line:   1,200 mg, 2 tab(s),  Oral, daily, 0 Refill(s) ; Catalog Code:   calcium carbonate ; Order Dt/Tm:   4/1/2019 10:06:19 AM CDT          multivitamin with minerals  :   multivitamin with minerals ; Status:   Documented ; Ordered As Mnemonic:   Vitamin D with Minerals oral tablet ; Simple Display Line:   1 tab(s), Oral, daily, 0 Refill(s) ; Catalog Code:   multivitamin with minerals ; Order Dt/Tm:   4/1/2019 10:06:08 AM CDT          omega-3 polyunsaturated fatty acids  :   omega-3 polyunsaturated fatty acids ; Status:   Documented ; Ordered As Mnemonic:   Omega-3 oral capsule ; Simple Display Line:   0 Refill(s) ; Catalog Code:   omega-3 polyunsaturated fatty acids ; Order Dt/Tm:   4/1/2019 10:06:02 AM CDT

## 2022-02-16 NOTE — PROGRESS NOTES
Patient:   RACHEL VIVEROS            MRN: [id number]            FIN: [id number]               Age:   69 years     Sex:  Female     :  1950   Associated Diagnoses:   Bronchitis   Author:   Donte Rai PA-C      Report Summary   Diagnosis  Bronchitis ([id number] J40).  Patient InstructionsOrders   Visit Information      Date of Service: 2019 02:06 pm  Performing Location: [facility]  Encounter#: [id number]      Primary Care Provider (PCP):  Joey Vu MD    NPI# [id number]      Referring Provider:  Donte Rai PA-C    NPI# [id number]   Visit type:  New symptom.    Source of history:  Self, Medical record.    History limitation:  None.       Chief Complaint   2019 2:20 PM CST   Cough, congestion, body aches x 3 days        History of Present Illness             The patient presents with cough.  The severity of the cough is moderate.  The cough is constant.  The cough has lasted for 4 day(s).  Associated symptoms consist of nasal congestion.        Review of Systems   Eye:  Negative.    Ear/Nose/Mouth/Throat:  Negative except as documented in history of present illness.    Respiratory:  Negative except as documented in history of present illness.       Health Status   Allergies:    Allergic Reactions (All)  No Known Medication Allergies  Canceled/Inactive Reactions (All)  No known allergies   Medications:  (Selected)   Prescriptions  Prescribed  Zithromax 250 mg oral tablet: = 1 packet(s), PO, Once, Instructions: as directed on package labeling. Two tablets po on day one, then one tablet daily x four days, # 6 tab(s), 0 Refill(s), Type: Soft Stop, Pharm[facility] , 1 packet(s) Oral once,Instr:...  Documented Medications  Documented  Omega-3 oral capsule: 0 Refill(s), Type: Maintenance  Vitamin D with Minerals oral tablet: 1 tab(s), Oral, daily, 0 Refill(s), Type: Maintenance  calcium (as carbonate) 600 mg oral tablet: = 2 tab(s) ( 1,200 mg ), Oral,  daily, 0 Refill(s), Type: Maintenance,    Medications          *denotes recorded medication          Zithromax 250 mg oral tablet: 1 packet(s), PO, Once, as directed on package labeling. Two tablets po on day one, then one tablet daily x four days, 6 tab(s), 0 Refill(s).          *calcium (as carbonate) 600 mg oral tablet: 1,200 mg, 2 tab(s), Oral, daily, 0 Refill(s).          *Vitamin D with Minerals oral tablet: 1 tab(s), Oral, daily, 0 Refill(s).          *Omega-3 oral capsule: 0 Refill(s).       Problem list:    All Problems  Varicose veins / SNOMED CT [id number] / Confirmed  Vaginal dryness, menopausal / SNOMED CT 54593074 / Confirmed  Osteoarthritis of knee, unspecified / SNOMED CT 888816364 / Confirmed  History of traumatic brain injury / SNOMED CT 3799173647 / Confirmed  Full incontinence of feces / SNOMED CT 053262525 / Confirmed  Family history of kidney cancer / ICD-9-CM V16.51 / Confirmed  Disorder of bone, unspecified / SNOMED CT 7062699735 / Confirmed  Inactive: SCARLET FEVER / ICD-9-.1  Inactive: Rheumatic Fever / ICD-9-  Inactive: Leukoplakia / SNOMED CT 59961627  Inactive: Dyslipidemia / ICD-9-.4  Resolved: Pregnancy / SNOMED CT 776802699  Resolved: Pregnancy / SNOMED CT 713599576  Resolved: Foot neuroma / ICD-9-.3  Resolved: ASCUS on PAP Smear / ICD-9-.01  Resolved: *Hospitalized - TBI      Histories   Past Medical History:    Active  Full incontinence of feces (253840569): Onset in 2004 at 53 years.  Comments:  2/21/2013 CST 11:45 AM Susana Diaz  Referral / pelvic floor disorder.  Disorder of bone, unspecified (9664461107): Onset in 2003 at 52 years.  Osteoarthritis of knee, unspecified (399056332)  Comments:  2/21/2013 CST 11:50 AM Susana Diaz  Bilateral    2/21/2013 CST 11:58 AM Susana Diaz  Evaluated by Dwight Bianchi MD, on 08/26/2008.  Vaginal dryness, menopausal (49382753)  Resolved  *Hospitalized - TBI: Onset on  2016 at 66 years.  Resolved on 2016 at 66 years.  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  Resolved.  Comments:  2013 CST 11:41 AM Susana Diaz  Removed from right foot.  ASCUS on PAP Smear (795.01): Onset in the month of 2001 at 50 years  Resolved.  Comments:  2013 CST 11:43 AM Susana Diaz  Okay on repeat Pap.  Pregnancy (412090268):  Resolved in  at 17 years.  Pregnancy (994193840):  Resolved in  at 19 years.   Family History:    Diabetes mellitus  Grandmother (P)  CA - Cancer  Father (Sander, )  Sister  CA - Cancer of colon  Father (Sander, )  CHF - Congestive heart failure  Mother (Tammi, )  Cancer  Sister  Comments:  2013 11:37 AM Susana Diaz  One sister has CA of kidney.  Father (Sander, )  Comments:  2013 11:37 AM Susana Diaz  CA bones / colon  Diabetes  Grandmother (P)  Comments:  2013 11:38 AM Susana Diaz  DM  Congestive Heart Failure  Mother (Tammi, )  Bone cancer.  Father (Sander, )  Kidney failure.  Father (Sander, )  Kidney cancer, primary, with metastasis from kidney to other site.  Father (Sander, )  Sister  Comments:  3/26/2013 6:09 PM CDT - Dora Joseph CMA  1 kidney removed at age 53     Procedure history:    InterStim lead and modulator placement on 3/12/2014 at 64 Years.  Colonoscopy (725146840) on 3/19/2013 at 63 Years.  Comments:  3/28/2013 3:19 PM CDT - Tesfaye Ortiz MA  Patient had poor prep and should repeat in 5 years with MAC sedation. She should have 2 days of clear lliquids instead of the one day before.  DEXA - Dual energy X-ray photon absorptiometry (574431961) on 2013 at 63 Years.  Comments:  3/19/2013 11:48 AM CDT - Yessy Reza  Stable mild-mod osteopenia. OK to repeat 5 yrs. (Due 2018)  Rectal prolapse surgery on 2010 at 60 Years.  Comments:  10/28/2010 4:27 PM CDT - Monisha WISEMAN, Yessy Bradshaw.  [place] -  Dual energy X-ray photon absorptiometry (179026794) on 12/14/2009 at 59 Years.  Comments:  12/6/2010 8:37 AM Yessy South  Stable osteopenia. Repeat 3-5 yrs.  Excision of vaginal lesion on 11/26/2008 at 58 Years.  Posterior repair with prolift on 9/3/2008 at 58 Years.  Comments:  2/21/2013 11:56 AM Susana Diaz  Symptomatic rectocele and enterocele.  Colonoscopy (204473070) on 8/18/2008 at 58 Years.  Exercise stress test (107559936) on 11/8/2006 at 56 Years.  Comments:  2/21/2013 12:02 PM Susana Diaz  Indication:  Neck tightness.  Maternal history of CHF.  Bone density scan (121938997) on 4/21/2006 at 56 Years.  Neuroma (215.9) in the month of 10/2003 at 53 Years.  Comments:  2/21/2013 11:40 AM Susana Diaz  Removed from left foot.  Colonoscopy (736782129) in 2002 at 52 Years.  Comments:  12/6/2010 8:36 AM Yessy South  Repeat 10 yrs. (Due 2012)    7/7/2010 3:38 PM CDT - Joleen Machado  normal  Hysterectomy in 1997 at 47 Years.  Comments:  2/21/2013 11:51 AM Susana Diaz  Ovaries remain in place.  Tonsillectomy (919520566).  Adenoidectomy (191078141).  Appendectomy (770613251).   Social History:        Alcohol Assessment: Denies Alcohol Use            Never      Tobacco Assessment            Never      Substance Abuse Assessment: Denies Substance Abuse            Never      Employment and Education Assessment                     Comments:                      02/25/2013 - Dora Joseph CMA                     Homemaker      Home and Environment Assessment            Marital status:  (Living together).  2 children.  Living situation: Home/Independent.                     Comments:                      12/15/2011 - Dora Joseph CMA                      - Josep White      Exercise and Physical Activity Assessment: Regular exercise            Exercise type: Yoga.            Exercise frequency: 3 times/week.                     Comments:                       12/06/2010 [facility], Dora                     Strength classes 2x/wk        Physical Examination   Vital Signs   11/22/2019 2:20 PM CST Temperature Tympanic 99.1 DegF    Peripheral Pulse Rate 80 bpm    Pulse Site Radial artery    HR Method Manual    Systolic Blood Pressure 126 mmHg    Diastolic Blood Pressure 72 mmHg    Mean Arterial Pressure 90 mmHg    BP Site Left arm    BP Method Manual      Measurements from flowsheet : Measurements   11/22/2019 2:20 PM CST Height Measured - Standard 66.75 in    Weight Measured - Standard 175.4 lb    BSA 1.93 m2    Body Mass Index 27.67 kg/m2  HI      General:  Alert and oriented, No acute distress.    Eye:  Pupils are equal, round and reactive to light, Extraocular movements are intact, Normal conjunctiva.    HENT:  Normocephalic, Oral mucosa is moist, No pharyngeal erythema.    Neck:  Supple, Non-tender, No lymphadenopathy.    Respiratory:  Respirations are non-labored, Breath sounds are equal.         Breath sounds: Rhonchi present.    Cardiovascular:  Normal rate, Regular rhythm, No murmur.    Psychiatric:  Cooperative, Appropriate mood & affect.       Impression and Plan   Diagnosis     Bronchitis (ENN12-CM J40).     Patient Instructions:       Counseled: Patient, Regarding diagnosis, Regarding treatment, Regarding medications, Regarding activity, Verbalized understanding.    Orders     Orders (Selected)   Prescriptions  Prescribed  Zithromax 250 mg oral tablet: = 1 packet(s), PO, Once, Instructions: as directed on package labeling. Two tablets po on day one, then one tablet daily x four days, # 6 tab(s), 0 Refill(s), Type: Soft Stop, Pharmacy: [facility] , 1 packet(s) Oral once,Instr:....     Take medicine as prescribed, side effects discussed.  Tylenol/ibuprofen for fever and discomfort.  Push fluids.  RTC if not improving in 36-48 hours, prior if concerns as we have discussed.

## 2022-02-16 NOTE — TELEPHONE ENCOUNTER
---------------------  From: Dalia Andrews LPN (Phone Messages Pool ([id number]_[facility]))   To: RAJI Message Pool ([address]);     Sent: 10/7/2019 4:50:56 PM CDT  Subject: posion ivy     Phone Message    PCP:   ERNESTO      Time of Call:  4:21pm       Person Calling:  pt   Phone number:  [phone]    Note:   Pt LM stating she has poison ivy and would like Rx sent to [facility] in [facility] ([address] [facility] [address]). [facility] phone number [phone]    Last office visit and reason: 4/1/19 Annual Medicare Wellness Female---------------------  From: Samantha/Geovanna BRASHER ([place] Message Pool ([id number]_[place]))   To: Joey Vu MD;     Sent: 10/7/2019 4:51:49 PM CDT  Subject: FW: posion ivy---------------------  From: Joey Vu MD   To: RAJI Message Pool ([id number]_[place]);     Sent: 10/7/2019 4:57:31 PM CDT  Subject: RE: posion ivy     Prednisone 20 mg daily for 7 days.Rx sent inPatient notified.   PATIENT GIVEN APPT

## 2022-02-16 NOTE — NURSING NOTE
CAGE Assessment Entered On:  4/1/2019 10:36 AM CDT    Performed On:  4/1/2019 10:35 AM CDT by Callie Valdivia CMA               Assessment   Have you ever felt you should cut down on your drinking :   No   Have people annoyed you by criticizing your drinking :   No   Have you ever felt bad or guilty about your drinking :   No   Have you ever taken a drink first thing in the morning to steady your nerves or get rid of a hangover (Eye-opener) :   No   CAGE Score :   0    Callie Valdivia CMA - 4/1/2019 10:35 AM CDT

## 2022-02-16 NOTE — LETTER
(Inserted Image. Unable to display)   March 06, 2020      RACHEL VIVEROS   [address] [place], [place] [id number]        Dear RACHEL,      Thank you for selecting [facility] ([facility]viously [facility] for your healthcare needs.       TSH normal at 2.98          Please contact me or my assistant at [phone] you have any questions or concerns.     Sincerely,        Joey Vu MD

## 2022-02-16 NOTE — NURSING NOTE
Medicare Visit Entered On:  2019 10:15 AM CDT    Performed On:  2019 10:04 AM CDT by Callie Valdivia CMA               Summary   Chief Complaint :   AWV   Advance Directive :   No   Menstrual Status :   Postmenopausal   Weight Measured :   175 lb(Converted to: 175 lb 0 oz, 79.38 kg)    Height Measured :   66.75 in(Converted to: 5 ft 7 in, 169.54 cm)    Body Mass Index :   27.61 kg/m2 (HI)    Body Surface Area :   1.93 m2   Systolic Blood Pressure :   120 mmHg   Diastolic Blood Pressure :   84 mmHg (HI)    Mean Arterial Pressure :   96 mmHg   Peripheral Pulse Rate :   60 bpm   BP Site :   Right arm   Pulse Site :   Radial artery   BP Method :   Manual   HR Method :   Manual   Temperature Tympanic :   96.9 DegF(Converted to: 36.1 DegC)  (LOW)    Race :      Languages :   English   Ethnicity :   Not  or    Callie Valdivia CMA - 2019 10:04 AM CDT   Health Status   Allergies Verified? :   Yes   Medication History Verified? :   Yes   Medical History Verified? :   Yes   Pre-Visit Planning Status :   Completed   Tobacco Use? :   Never smoker   Callie Valdivia CMA - 2019 10:04 AM CDT   Demographics   Last Name :   White   Address :   [address]   First Name :   Parris Bautista Initial :   A   Responsible Party Date of Birth () :   1950 CST   City :   [place]   State :   [place]   Zip Code :   [address]   Callie Valdivia CMA - 2019 10:04 AM CDT   Providers Grid   Provider Name :    [facility] rosa Meier           Provider Specialty :       Dentist             Callie Valdivia CMA - 2019 10:04 AM CDT  Callie Valdivia CMA - 2019 10:04 AM CDT        Ancillary Services Grid   Name :    [facility]              Type of Service :    Other: Imaging studies on 2016.              Location :    [place], [place]                Callie Valdivia CMA - 2019 10:04 AM CDT         Consents   Consent for Immunization Exchange :   Consent Granted   Consent for Immunizations to  Providers :   Consent Granted   Skip Callie ALVA - 2019 10:04 AM CDT   Procedures / Surgeries        -    Procedure History   (As Of: 2019 10:15:05 AM CDT)     Procedure Dt/Tm:    ; Anesthesia Minutes:   0 ; Procedure Name:   Colonoscopy ; Procedure Minutes:   0 ; Comments:     2010 8:36 AM Yessy South  Repeat 10 yrs. (Due )  2010 3:38 PM CDT - Joleen Machado  normal            Procedure Dt/Tm:   9/3/2008 ; Anesthesia Minutes:   0 ; Procedure Name:   Posterior repair with prolift ; Procedure Minutes:   0 ; Comments:     2013 11:56 AM Susana Diaz  Symptomatic rectocele and enterocele.            Procedure Dt/Tm:   2008 ; Anesthesia Minutes:   0 ; Procedure Name:   Excision of vaginal lesion ; Procedure Minutes:   0            Procedure Dt/Tm:    ; Anesthesia Minutes:   0 ; Procedure Name:   Hysterectomy ; Procedure Minutes:   0 ; Comments:     2013 11:51 AM Susana Diaz  Ovaries remain in place.            Anesthesia Minutes:   0 ; Procedure Name:    x2 ; Procedure Minutes:   0 ; Comments:     2013 11:39 AM Susana Diaz  1968 and 1970            Anesthesia Minutes:   0 ; Procedure Name:   Tonsillectomy ; Procedure Minutes:   0            Anesthesia Minutes:   0 ; Procedure Name:   Adenoidectomy ; Procedure Minutes:   0            Anesthesia Minutes:   0 ; Procedure Name:   Appendectomy ; Procedure Minutes:   0            Procedure Dt/Tm:   2010 ; Anesthesia Minutes:   0 ; Procedure Name:   Rectal prolapse surgery ; Procedure Minutes:   0 ; Comments:     10/28/2010 4:27 PM CDT - Yessy Reza Dr..            Procedure Dt/Tm:   2009 ; Anesthesia Minutes:   0 ; Procedure Name:   DEXA - Dual energy X-ray photon absorptiometry ; Procedure Minutes:   0 ; Comments:     2010 8:37 AM Yessy South  Stable osteopenia. Repeat 3-5 yrs.            Procedure Dt/Tm:   2006 ; Anesthesia  Minutes:   0 ; Procedure Name:   Exercise stress test ; Procedure Minutes:   0 ; Comments:     2/21/2013 12:02 PM CST - Susana Eaton  Indication:  Neck tightness.  Maternal history of CHF.            Procedure Dt/Tm:   10/2003 ; Anesthesia Minutes:   0 ; Procedure Name:   Neuroma ; Procedure Minutes:   0 ; Comments:     2/21/2013 11:40 AM CST Susana Baez  Removed from left foot.            Procedure Dt/Tm:   4/21/2006 ; Anesthesia Minutes:   0 ; Procedure Name:   Bone density scan ; Procedure Minutes:   0            Procedure Dt/Tm:   3/19/2013 ; Provider:   Dheeraj Howard MD; Anesthesia Minutes:   0 ; Procedure Name:   Colonoscopy ; Procedure Minutes:   0 ; Comments:     3/28/2013 3:19 PM CDT - Tesfaye Ortiz MA  Patient had poor prep and should repeat in 5 years with MAC sedation. She should have 2 days of clear lliquids instead of the one day before.            Procedure Dt/Tm:   2/28/2013 ; Anesthesia Minutes:   0 ; Procedure Name:   DEXA - Dual energy X-ray photon absorptiometry ; Procedure Minutes:   0 ; Comments:     3/19/2013 11:48 AM CDT - Yessy Reza  Stable mild-mod osteopenia. OK to repeat 5 yrs. (Due 2018)            Procedure Dt/Tm:   3/12/2014 ; Provider:   Kevin Brewer DO; Anesthesia Minutes:   0 ; Procedure Name:   InterStim lead and modulator placement ; Procedure Minutes:   0            Procedure Dt/Tm:   8/18/2008 ; Provider:   Dheeraj Howard MD; Anesthesia Minutes:   0 ; Procedure Name:   Colonoscopy ; Procedure Minutes:   0            Meds / Allergies   (As Of: 4/1/2019 10:15:05 AM CDT)   Allergies (Active)   No Known Medication Allergies  Estimated Onset Date:   Unspecified ; Created By:   Callie Valdivia CMA; Reaction Status:   Active ; Category:   Drug ; Substance:   No Known Medication Allergies ; Type:   Allergy ; Updated By:   Callie Valdivia CMA; Reviewed Date:   4/1/2019 10:05 AM CDT        Medication List   (As Of: 4/1/2019 10:15:05 AM CDT)   Prescription/Discharge Order     codeine-guaifenesin  :   codeine-guaifenesin ; Status:   Completed ; Ordered As Mnemonic:   codeine-guaifenesin 10 mg-100 mg/5 mL oral syrup ; Simple Display Line:   5 mL, PO, q6 hrs, for 5 day(s), not to exceed 12 teaspoons/day, PRN: for cough, 240 mL, 0 Refill(s) ; Ordering Provider:   Yamila Stern; Catalog Code:   codeine-guaifenesin ; Order Dt/Tm:   1/7/2019 5:43:06 PM          doxycycline  :   doxycycline ; Status:   Completed ; Ordered As Mnemonic:   doxycycline monohydrate 100 mg oral capsule ; Simple Display Line:   100 mg, 1 cap(s), Oral, bid, for 10 day(s), 20 cap(s), 0 Refill(s) ; Ordering Provider:   Yamila Stern; Catalog Code:   doxycycline ; Order Dt/Tm:   1/7/2019 5:39:22 PM          predniSONE  :   predniSONE ; Status:   Completed ; Ordered As Mnemonic:   predniSONE 20 mg oral tablet ; Simple Display Line:   20 mg, 1 tab(s), PO, bid, for 5 day(s), with food or milk, second dose before 3 pm, 10 tab(s), 0 Refill(s) ; Ordering Provider:   Yamila Stern; Catalog Code:   predniSONE ; Order Dt/Tm:   1/7/2019 5:41:58 PM            Home Meds    calcium carbonate  :   calcium carbonate ; Status:   Documented ; Ordered As Mnemonic:   calcium (as carbonate) 600 mg oral tablet ; Simple Display Line:   1,200 mg, 2 tab(s), Oral, daily, 0 Refill(s) ; Catalog Code:   calcium carbonate ; Order Dt/Tm:   4/1/2019 10:06:19 AM          multivitamin with minerals  :   multivitamin with minerals ; Status:   Documented ; Ordered As Mnemonic:   Vitamin D with Minerals oral tablet ; Simple Display Line:   1 tab(s), Oral, daily, 0 Refill(s) ; Catalog Code:   multivitamin with minerals ; Order Dt/Tm:   4/1/2019 10:06:08 AM          omega-3 polyunsaturated fatty acids  :   omega-3 polyunsaturated fatty acids ; Status:   Documented ; Ordered As Mnemonic:   Omega-3 oral capsule ; Simple Display Line:   0 Refill(s) ; Catalog Code:   omega-3 polyunsaturated fatty acids ; Order Dt/Tm:   4/1/2019 10:06:02 AM             Geriatric Depression Screening   Geriatric Depression Satisfied Life :   Yes   Geriatric Depression Dropped Activities :   No   Geriatric Depression Life Empty :   No   Geriatric Depression Bored :   No   Geriatric Depression Good Spirits :   Yes   Geriatric Depression Afraid Bad Things :   No   Geriatric Depression Feel Happy :   Yes   Geriatric Depression Feel Helpless :   No   Geriatric Depression Prefer to Stay Home :   No   Geriatric Depression Memory Problems :   No   Geriatric Depression Wonderful Be Alive :   Yes   Geriatric Depression Feel Worthless :   No   Geriatric Depression Situation Hopeless :   No   Geriatric Depression Others Better Off :   No   Geriatric Depression Full of Energy :   Yes   Geriatric Depression Total Score :   0    Callie Valdivia CMA - 4/1/2019 10:04 AM CDT   Hearing and Vision Screening   Audiogram Result Right Ear :   Pass   Audiogram Result Left Ear :   Pass   Visual Acuity Evaluated Left Eye :   20/20   Visual Acuity Evaluated Right Eye :   20/15   Vision Test Type :   Snellen   Vision Screen Comments :   Both Eyes:20/15   Callie Valdivia CMA - 4/1/2019 10:04 AM CDT   Home Safety Screen   Emergency Numbers Kept/Updated :   Yes   Aware of Smoking Dangers :   Yes   Smoke Alarms/Fire Extinguisher Available :   Yes   Household Members Fire Safety Knowledge :   Yes   Firearms Unloaded and Secure :   Yes   Floor Rugs Removed or Fastened :   Yes   Mats in Bathtub/Shower :   Yes   Stairway Rails or Banisters :   Yes   Outdoor Clutter Safety :   Yes   Indoor Clutter Safety :   Yes   Electrical Cord Safety :   Yes   Callie Valdivia CMA - 4/1/2019 10:04 AM CDT   Advance Directives   Advance Directive :   No   Advance Directive Location :   Family to bring in copy from home   Callie Valdivia CMA 4/1/2019 10:04 AM CDT   Steward Fall Risk   History of Fall in Last 3 Months Steward :   No   Callie Valdivia CMA - 4/1/2019 10:04 AM CDT   Functional Assessment   Focused Functional Assessment Grid   Bathing :    Independent (2)   Dressing :   Independent (2)   Toileting :   Independent (2)   Transferring Bed or Chair :   Independent (2)   Feeding :   Independent (2)   Callie Valdivia CMA - 4/1/2019 10:04 AM CDT   Capable of Shopping :   Yes   Capable of Walking :   Yes   Capable of Housekeeping :   Yes   Capable of Managing Medications :   Yes   Capable of Handling Finances :   Yes   Callie Valdivia CMA - 4/1/2019 10:04 AM CDT

## 2022-03-02 NOTE — TELEPHONE ENCOUNTER
---------------------  From: Mari Iglesias RN (Phone Messages Pool ([id number]_[facility]))   To: Phone Messages Narrative Science ([address]_[place]);     Sent: 1/3/2022 10:49:58 AM CST  Subject: Covid results fax forward     Time of Call:  0933  Return call at:1045     Person Calling:  patient  Phone number:  [phone]    Note:  The patient would like her Covid results faxed to [facility] in [place]. I Called [facility] in [place] to obtain a fax number and they are not answering calls due to high call volume. The recording says to call back later. The fax number is not on the web site. I have left the patient a message to tell her I am unable to reach [facility] in [place] at this time.      Last office visit and reason:  12/31/21 Covid testingPt returned call and gave fax #[phone]. Results letter printed and faxed per pt request. Pt informed and expressed appreciation.Pt called back to say that wrong fax was given so [facility] did not receive it... results faxed to [phone] and confirmation received

## 2022-03-02 NOTE — TELEPHONE ENCOUNTER
---------------------  From: Dalia Andrews LPN (Phone Messages Pool ([id number]_[facility]))   Sent: 12/31/2021 3:06:43 PM CST  Subject: covid testing question     Phone Message    PCP:   ERNESTO      Time of Call:  3:04pm       Person Calling:  pt    Note:   Pt calling stating she was in for a covid test and is wondering if it is a PCR test and wanting to know if she was also tested for influenza.    Told pt that our covid tests are PCR tests.  Pt only had 1 swab- told her she was only tested for covid.    Last office visit and reason:  12/31/21 telephone encounter2, covid exposure

## 2022-03-02 NOTE — NURSING NOTE
Comprehensive Intake Entered On:  12/31/2021 10:52 AM CST    Performed On:  12/31/2021 10:49 AM CST by Manuel Sparks CMA               Summary   Chief Complaint :   Verbal consent given for a telephone visit.  Pt states she had close ontact with daughter who teswted positive 5 days ago.  Pt denies any sx.   Advance Directive :   No   Menstrual Status :   Postmenopausal   Race :      Languages :   English   Ethnicity :   Not  or    Manuel Sparks CMA - 12/31/2021 10:49 AM CST   Health Status   Allergies Verified? :   Yes   Medication History Verified? :   Yes   Medical History Verified? :   Yes   Pre-Visit Planning Status :   Completed   Tobacco Use? :   Never smoker   Manuel Sparks CMA - 12/31/2021 10:49 AM CST   Meds / Allergies   (As Of: 12/31/2021 10:52:11 AM CST)   Allergies (Active)   No Known Medication Allergies  Estimated Onset Date:   Unspecified ; Created By:   Callie Valdivia CMA; Reaction Status:   Active ; Category:   Drug ; Substance:   No Known Medication Allergies ; Type:   Allergy ; Updated By:   Callie Valdivia CMA; Reviewed Date:   12/31/2021 10:52 AM CST        Medication List   (As Of: 12/31/2021 10:52:11 AM CST)   Prescription/Discharge Order    azithromycin  :   azithromycin ; Status:   Processing ; Ordered As Mnemonic:   Zithromax 250 mg oral tablet ; Ordering Provider:   Donte Rai PA-C; Action Display:   Complete ; Catalog Code:   azithromycin ; Order Dt/Tm:   12/31/2021 10:51:06 AM CST            Home Meds    calcium carbonate  :   calcium carbonate ; Status:   Documented ; Ordered As Mnemonic:   calcium (as carbonate) 600 mg oral tablet ; Simple Display Line:   1,200 mg, 2 tab(s), Oral, daily, 0 Refill(s) ; Catalog Code:   calcium carbonate ; Order Dt/Tm:   4/1/2019 10:06:19 AM CDT          multivitamin with minerals  :   multivitamin with minerals ; Status:   Documented ; Ordered As Mnemonic:   Vitamin D with Minerals oral tablet ; Simple Display Line:   1 tab(s),  Oral, daily, 0 Refill(s) ; Catalog Code:   multivitamin with minerals ; Order Dt/Tm:   4/1/2019 10:06:08 AM CDT          omega-3 polyunsaturated fatty acids  :   omega-3 polyunsaturated fatty acids ; Status:   Documented ; Ordered As Mnemonic:   Omega-3 oral capsule ; Simple Display Line:   0 Refill(s) ; Catalog Code:   omega-3 polyunsaturated fatty acids ; Order Dt/Tm:   4/1/2019 10:06:02 AM CDT            Social History   Social History   (As Of: 12/31/2021 10:52:11 AM CST)   Alcohol:  Denies Alcohol Use      Never   (Last Updated: 3/4/2016 4:13:36 PM CST by Sanna Truong CMA)          Tobacco:        Never   (Last Updated: 6/19/2014 9:53:33 AM CDT by Akanksha Martinez RN)   Never (less than 100 in lifetime)   (Last Updated: 12/31/2021 10:51:24 AM CST by Manuel Sparks CMA)          Electronic Cigarette/Vaping:        Electronic Cigarette Use: Never.   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  2 children.  Living situation: Home/Independent.   Comments:  12/15/2011 10:56 AM - Dora Joseph CMA:  - Josep White   (Last Updated: 5/2/2019 2:20:25 PM CDT by Alina De Guzman)          Exercise:  Regular exercise      Exercise frequency: 3 times/week.   Comments:  12/6/2010 8:30 AM - Dora Joseph CMA: Strength classes 2x/wk   (Last Updated: 12/6/2010 8:30:37 AM CST by Dora Joseph CMA)   Exercise type: Yoga.   (Last Updated: 12/6/2010 8:30:43 AM CST by Dora Joseph CMA)

## 2022-03-02 NOTE — TELEPHONE ENCOUNTER
---------------------  From: Manuel Sparks CMA ([facility] Message Pool ([id number]))   To: Appointment Pool ([id number]);     Sent: 12/31/2021 11:05:23 AM CST !  Subject: curbside     Please add pt to [facility] today for covid testing per BRIANNA.  Manuel Sparks CMAPt scheduled.

## 2022-03-02 NOTE — PROGRESS NOTES
Patient:   RACHEL VIVEROS            MRN: [id number]            FIN: [id number]               Age:   71 years     Sex:  Female     :  1950   Associated Diagnoses:   Close exposure to COVID-19 virus   Author:   Justo Christensen PA-C      Visit Information      Date of Service: 2021 09:24 am  Performing Location: [facility]  Encounter#: [id number]      Primary Care Provider (PCP):  Joey Vu MD    NPI# [id number]   Visit type:  Telephone Encounter.    Source of history:  Patient.    Location of patient:  _home  Call Start Time:   _105  Call End Time:    _1103      Chief Complaint   2021 10:49 AM CST  Verbal consent given for a telephone visit.  Pt states she had close ontact with daughter who teswted positive 5 days ago.  Pt denies any sx.     _      History of Present Illness   Today's visit was conducted via telephone due to the COVID-19 pandemic. Patient's consent to telephone visit was obtained and documented.      Reason for visit:  _ Chief complaint and symptoms noted above and confirmed with patient   she had close contact with her daughter who tested positive for Covid 5 days ago  she does not have any sxs    she has not been vaccinated for Covid      Review of Systems   Constitutional:  Negative.    Ear/Nose/Mouth/Throat:  Negative.    Respiratory:  Negative.       Impression and Plan   Diagnosis     Close exposure to COVID-19 virus (RCM03-TJ Z20.822).     Plan:  will get her tested for Covid today, advised to stay isolated and wear her mask, follow up if not improving.    Orders     Orders   Requests (Lab):  SARS-CoV-2 RNA (COVID-19), Qualitative NAAT (Request) (Order): Close exposure to COVID-19 virus.     Orders   Charges (Evaluation and Management):  [id number] office o/p est low 20-29 min (Charge) (Order): Quantity: 1, Close exposure to COVID-19 virus.        Health Status   Allergies:    Allergic Reactions (Selected)  No Known Medication Allergies   Medications:   (Selected)   Documented Medications  Documented  Omega-3 oral capsule: 0 Refill(s), Type: Maintenance  Vitamin D with Minerals oral tablet: 1 tab(s), Oral, daily, 0 Refill(s), Type: Maintenance  calcium (as carbonate) 600 mg oral tablet: = 2 tab(s) ( 1,200 mg ), Oral, daily, 0 Refill(s), Type: Maintenance   Problem list:    All Problems  Disorder of bone, unspecified / SNOMED CT 1079659610 / Confirmed  History of traumatic brain injury / SNOMED CT 1881967420 / Confirmed  Osteoarthritis of knee, unspecified / SNOMED CT 514867589 / Confirmed  Full incontinence of feces / SNOMED CT 217561174 / Confirmed  Vaginal dryness, menopausal / SNOMED CT 40689777 / Confirmed  Varicose veins / SNOMED CT 639V702N-5OO8-4710-7LM8-[id number] / Confirmed  Family history of kidney cancer / ICD-9-CM V16.51 / Confirmed      Histories   Past Medical History:    Active  Full incontinence of feces (748791700): Onset in  at 53 years.  Comments:  2013 CST 11:45 AM Susana Diaz  Referral / pelvic floor disorder.  Disorder of bone, unspecified (5524604306): Onset in  at 52 years.  Osteoarthritis of knee, unspecified (648453703)  Comments:  2013 CST 11:50 AM Susana Diaz  Bilateral    2013 CST 11:58 AM Susana Diaz  Evaluated by Dwight Bianchi MD, on 2008.  Vaginal dryness, menopausal (51793232)  Resolved  *Hospitalized - TBI: Onset on 2016 at 66 years.  Resolved on 2016 at 66 years.  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  Resolved.  Comments:  2013 CST 11:41 AM Susana Diaz  Removed from right foot.  ASCUS on PAP Smear (795.01): Onset in the month of 2001 at 50 years  Resolved.  Comments:  2013 CST 11:43 AM Susana Diaz  Okay on repeat Pap.  Pregnancy (074032062):  Resolved in  at 17 years.  Pregnancy (384729867):  Resolved in  at 19 years.   Family History:    Diabetes mellitus  Grandmother (P)  CA - Cancer  Father (Sander, )  Sister  CA -  Cancer of colon  Father (Sander, )  CHF - Congestive heart failure  Mother (Tammi, )  Cancer  Sister  Comments:  2013 11:37 AM Susana Diaz  One sister has CA of kidney.  Father (Sander, )  Comments:  2013 11:37 AM Susana Diaz  CA bones / colon  Diabetes  Grandmother (P)  Comments:  2013 11:38 AM Susana Diaz  DM  Congestive Heart Failure  Mother (Tammi, )  Bone cancer.  Father (Sander, )  Kidney failure.  Father (Sander, )  Kidney cancer, primary, with metastasis from kidney to other site.  Father (Sander, )  Sister  Comments:  3/26/2013 6:09 PM CDT - Dora Joseph CMA  1 kidney removed at age 53     Procedure history:    InterStim lead and modulator placement on 3/12/2014 at 64 Years.  Colonoscopy (071702706) on 3/19/2013 at 63 Years.  Comments:  3/28/2013 3:19 PM CDT - Tesfaye Ortiz MA  Patient had poor prep and should repeat in 5 years with MAC sedation. She should have 2 days of clear lliquids instead of the one day before.  DEXA - Dual energy X-ray photon absorptiometry (111375096) on 2013 at 63 Years.  Comments:  3/19/2013 11:48 AM IKER - Yessy Reza  Stable mild-mod osteopenia. OK to repeat 5 yrs. (Due 2018)  Rectal prolapse surgery on 2010 at 60 Years.  Comments:  10/28/2010 4:27 PM JOHNSONT - Yessy Reza Dr..  DEXA - Dual energy X-ray photon absorptiometry (487386086) on 2009 at 59 Years.  Comments:  2010 8:37 AM Yessy South  Stable osteopenia. Repeat 3-5 yrs.  Excision of vaginal lesion on 2008 at 58 Years.  Posterior repair with prolift on 9/3/2008 at 58 Years.  Comments:  2013 11:56 AM Susana Diaz  Symptomatic rectocele and enterocele.  Colonoscopy (359200116) on 2008 at 58 Years.  Exercise stress test (662205830) on 2006 at 56 Years.  Comments:  2013 12:02 PM CST - Susana Eaton  Indication:  Neck tightness.  Maternal  history of CHF.  Bone density scan (707744157) on 4/21/2006 at 56 Years.  Neuroma (215.9) in the month of 10/2003 at 53 Years.  Comments:  2/21/2013 11:40 AM MIAN - Eaton Susana  Removed from left foot.  Colonoscopy (412921501) in 2002 at 52 Years.  Comments:  12/6/2010 8:36 AM CST - Monisha NP-DOUGIE, Yessy  Repeat 10 yrs. (Due 2012)    7/7/2010 3:38 PM CDT - Joleen Machado  normal  Hysterectomy in 1997 at 47 Years.  Comments:  2/21/2013 11:51 AM Susana Diaz  Ovaries remain in place.  Tonsillectomy (728997481).  Adenoidectomy (540388127).  Appendectomy (457100281).   Social History:        Electronic Cigarette/Vaping Assessment            Electronic Cigarette Use: Never.      Alcohol Assessment: Denies Alcohol Use            Never      Tobacco Assessment            Never            Never (less than 100 in lifetime)      Substance Abuse Assessment: Denies Substance Abuse            Never      Employment and Education Assessment                     Comments:                      02/25/2013 - Dora Joseph CMA                     Homemaker      Home and Environment Assessment            Marital status:  (Living together).  2 children.  Living situation: Home/Independent.                     Comments:                      12/15/2011 - Dora Joseph CMA                      - Josep White      Exercise and Physical Activity Assessment: Regular exercise            Exercise type: Yoga.            Exercise frequency: 3 times/week.                     Comments:                      12/06/2010 - Dora Joseph CMA                     Strength classes 2x/wk

## 2022-03-02 NOTE — NURSING NOTE
Seen for COVID testing at [facility] [place] per Dr. Adelaide REED    Fax Result to: _    O2 Sat = _%99  (Children under 12 do not require O2 sat)    Specimen sent to:  _ Kimberly Humagade    PUI form faxed to _ [facility].

## 2022-07-11 ENCOUNTER — LAB (OUTPATIENT)
Dept: URGENT CARE | Facility: URGENT CARE | Age: 72
End: 2022-07-11
Attending: FAMILY MEDICINE
Payer: MEDICARE

## 2022-07-11 ENCOUNTER — TELEPHONE (OUTPATIENT)
Dept: FAMILY MEDICINE | Facility: CLINIC | Age: 72
End: 2022-07-11

## 2022-07-11 ENCOUNTER — VIRTUAL VISIT (OUTPATIENT)
Dept: FAMILY MEDICINE | Facility: CLINIC | Age: 72
End: 2022-07-11
Payer: MEDICARE

## 2022-07-11 DIAGNOSIS — R68.83 CHILLS: Primary | ICD-10-CM

## 2022-07-11 DIAGNOSIS — R05.9 COUGH: ICD-10-CM

## 2022-07-11 DIAGNOSIS — M79.10 MYALGIA: ICD-10-CM

## 2022-07-11 LAB
FLUAV AG SPEC QL IA: NEGATIVE
FLUBV AG SPEC QL IA: NEGATIVE

## 2022-07-11 PROCEDURE — 99213 OFFICE O/P EST LOW 20 MIN: CPT | Mod: CS | Performed by: FAMILY MEDICINE

## 2022-07-11 PROCEDURE — U0003 INFECTIOUS AGENT DETECTION BY NUCLEIC ACID (DNA OR RNA); SEVERE ACUTE RESPIRATORY SYNDROME CORONAVIRUS 2 (SARS-COV-2) (CORONAVIRUS DISEASE [COVID-19]), AMPLIFIED PROBE TECHNIQUE, MAKING USE OF HIGH THROUGHPUT TECHNOLOGIES AS DESCRIBED BY CMS-2020-01-R: HCPCS | Performed by: FAMILY MEDICINE

## 2022-07-11 PROCEDURE — 87804 INFLUENZA ASSAY W/OPTIC: CPT | Mod: QW | Performed by: FAMILY MEDICINE

## 2022-07-11 PROCEDURE — U0005 INFEC AGEN DETEC AMPLI PROBE: HCPCS | Performed by: FAMILY MEDICINE

## 2022-07-11 RX ORDER — ASCORBIC ACID 500 MG
500 TABLET ORAL
COMMUNITY

## 2022-07-11 RX ORDER — MULTIVITAMIN
1 TABLET ORAL DAILY
COMMUNITY

## 2022-07-11 RX ORDER — CHLORAL HYDRATE 500 MG
1 CAPSULE ORAL
COMMUNITY

## 2022-07-11 NOTE — PROGRESS NOTES
Telemedicine Visit  Call time: 10:59 AM through 11:07 AM  Patient is at home in [place], [place]  Physician in clinic in [place], [place]      Clinical Decision Making:    At the end of the encounter, I discussed results, diagnosis, medications. Discussed red flags for immediate return to clinic/ER, as well as indications for follow up if no improvement. Patient understood and agreed to plan. Patient was stable for discharge.      ICD-10-CM    1. Chills  R68.83 Influenza A & B Antigen - Clinic Collect     Symptomatic; Yes; 7/9/2022 COVID-19 Virus (Coronavirus) by PCR Nose   2. Myalgia  M79.10 Influenza A & B Antigen - Clinic Collect     Symptomatic; Yes; 7/9/2022 COVID-19 Virus (Coronavirus) by PCR Nose   3. Cough  R05.9 Influenza A & B Antigen - Clinic Collect     Symptomatic; Yes; 7/9/2022 COVID-19 Virus (Coronavirus) by PCR Nose     [facility]  Will test for COVID and influenza  In the meantime continue Tylenol and ibuprofen as needed  Continue vitamin C  Continue honey  May add over-the-counter cough medicine as desired    Discussed that if she is positive for COVID I would advise a telemedicine treatment visit to see which outpatient treatment regimen would be appropriate.    Patient verbalized understanding      There are no Patient Instructions on file for this visit.   No follow-ups on file.      chief complaint    HPI:  Parris Giraldo is a 72 year old female who presents today complaining of developing a cough on Saturday, July 9.  Yesterday on July 10 she continued with a dry cough and noticed some congestion.  She had chills and episodes of feeling warm.  Positive headache and fatigue.  Positive myalgias.  No runny nose  Her cough is worse when she is lying down  She denies sore throat, shortness of breath, nausea, diarrhea.  She has not been vaccinated to COVID    She is been using Tylenol, Advil, vitamin C and water with honey    History obtained from  chart review and the patient.    Problem List:  There are no relevant problems documented for this patient.      History reviewed. No pertinent past medical history.    Social History     Tobacco Use     Smoking status: Never Smoker     Smokeless tobacco: Not on file   Substance Use Topics     Alcohol use: No       Review of systems  negative except listed in HPI    There were no vitals filed for this visit.    Physical Exam  Telemedicine visit  In general she is alert, oriented, and in no acute distress  Speaks in full sentences and breathing is not labored

## 2022-07-11 NOTE — TELEPHONE ENCOUNTER
----- Message from Massiel Casillas MD sent at 7/11/2022  3:33 PM CDT -----  Please call patient with negative influenza results  COVID results should be available tomorrow

## 2022-07-11 NOTE — TELEPHONE ENCOUNTER
I called and LVMTCB, if/when patient calls back please inform her of Dr. Casillas's documentation below.

## 2022-07-12 LAB — SARS-COV-2 RNA RESP QL NAA+PROBE: POSITIVE

## 2022-07-13 ENCOUNTER — VIRTUAL VISIT (OUTPATIENT)
Dept: FAMILY MEDICINE | Facility: CLINIC | Age: 72
End: 2022-07-13
Payer: MEDICARE

## 2022-07-13 ENCOUNTER — TELEPHONE (OUTPATIENT)
Dept: FAMILY MEDICINE | Facility: CLINIC | Age: 72
End: 2022-07-13

## 2022-07-13 DIAGNOSIS — U07.1 INFECTION DUE TO 2019 NOVEL CORONAVIRUS: Primary | ICD-10-CM

## 2022-07-13 DIAGNOSIS — U07.1 INFECTION DUE TO 2019 NOVEL CORONAVIRUS: ICD-10-CM

## 2022-07-13 PROCEDURE — 99214 OFFICE O/P EST MOD 30 MIN: CPT | Mod: 95 | Performed by: NURSE PRACTITIONER

## 2022-07-13 NOTE — TELEPHONE ENCOUNTER
Faxed Positive Covid Result to [facility] at fax# [phone] per [place] guidelines. Patient has appointment today with Yamila Espinoza to discuss treatment options.

## 2022-07-13 NOTE — PROGRESS NOTES
Parris is a 72 year old who is being evaluated via a billable telephone visit.      What phone number would you like to be contacted at? [phone]   How would you like to obtain your AVS? [facility]    Assessment & Plan     (U07.1) Infection due to 2019 novel coronavirus  (primary encounter diagnosis)  Comment:   Plan:  nirmatrelvir and ritonavir         (PAXLOVID) therapy pack        Patient has had no COVID vaccines and was infected with COVID a few months ago.  This puts her at high risk for complications despite the fact that she is generally very healthy 72-year-old female.  She was given the use risks and benefits of using Paxil Anselmo and she would like to use Paxil Anselmo a prescription was sent                   No follow-ups on file.    Yamila Espinoza NP  [facility]    Len Nye is a 72 year old presenting for the following health issues: tested positive for COVID by PCR test 7/11/22, symptoms first started 7/10/22, she wants to discuss treatment options.  She has had no COVID vaccines and she had infection a few months ago with COVID  Covid Concern (Wants to discuss treatments)  31901}      Objective           Vitals:  No vitals were obtained today due to virtual visit.    Physical Exam   healthy, alert and no distress  PSYCH: Alert and oriented times 3; coherent speech, normal   rate and volume, able to articulate logical thoughts, able   to abstract reason, no tangential thoughts, no hallucinations   or delusions  Her affect is normal  RESP: No cough, no audible wheezing, able to talk in full sentences  Remainder of exam unable to be completed due to telephone visits                Phone call duration: 20 minutes    .  ..

## 2022-07-13 NOTE — TELEPHONE ENCOUNTER
Patient's pharmacy does not have Paxlovid in stock and are unable to get it. Patient would like rx sent to [facility] instead. Are you able to help with this?

## 2022-07-15 ENCOUNTER — TELEPHONE (OUTPATIENT)
Dept: FAMILY MEDICINE | Facility: CLINIC | Age: 72
End: 2022-07-15

## 2022-07-15 NOTE — TELEPHONE ENCOUNTER
RN received call from patient:    Started Paxlovid Wednesday, taken 3 doses     Having side effects, thinking she may have felt better before taking medication    Side effects: sick to her stomach, loose bowel, nasty taste and dry mouth    Patient is wondering if she can stop medication or if she needs to finish course.    [phone]    TAMIA Osborn  [facility]

## 2022-07-15 NOTE — TELEPHONE ENCOUNTER
Patient was given Paxlovid and having some side affects she is sick to her stomach has some bowl issues dry mouth and nasty taste in mouth, patient is wondering if she should stop taking this medication.

## 2022-10-02 ENCOUNTER — HEALTH MAINTENANCE LETTER (OUTPATIENT)
Age: 72
End: 2022-10-02

## 2023-02-11 ENCOUNTER — HEALTH MAINTENANCE LETTER (OUTPATIENT)
Age: 73
End: 2023-02-11

## 2024-03-09 ENCOUNTER — HEALTH MAINTENANCE LETTER (OUTPATIENT)
Age: 74
End: 2024-03-09

## 2024-06-25 ENCOUNTER — PATIENT OUTREACH (OUTPATIENT)
Dept: FAMILY MEDICINE | Facility: CLINIC | Age: 74
End: 2024-06-25
Payer: MEDICARE

## 2024-06-25 NOTE — TELEPHONE ENCOUNTER
Patient Quality Outreach    Patient is due for the following:   Physical Annual Wellness Visit    Next Steps:   No follow up needed at this time. Patient had her AWV with a provider at [facility] 4/16/2024.    Type of outreach:    Chart review performed, no outreach needed.      Questions for provider review:    None           Isis Cantu LPN

## 2025-04-03 ENCOUNTER — OFFICE VISIT (OUTPATIENT)
Dept: FAMILY MEDICINE | Facility: CLINIC | Age: 75
End: 2025-04-03
Payer: COMMERCIAL

## 2025-04-03 VITALS
HEIGHT: 67 IN | RESPIRATION RATE: 16 BRPM | OXYGEN SATURATION: 99 % | HEART RATE: 72 BPM | WEIGHT: 166 LBS | BODY MASS INDEX: 26.06 KG/M2 | DIASTOLIC BLOOD PRESSURE: 76 MMHG | TEMPERATURE: 98 F | SYSTOLIC BLOOD PRESSURE: 137 MMHG

## 2025-04-03 DIAGNOSIS — M19.072 PRIMARY OSTEOARTHRITIS OF LEFT FOOT: Primary | ICD-10-CM

## 2025-04-03 PROBLEM — M85.80 OSTEOPENIA: Status: ACTIVE | Noted: 2017-02-07

## 2025-04-03 PROBLEM — M17.9 OSTEOARTHRITIS OF KNEE: Status: ACTIVE | Noted: 2025-04-03

## 2025-04-03 PROBLEM — Z86.79 HISTORY OF VARICOSE VEINS: Status: ACTIVE | Noted: 2025-04-03

## 2025-04-03 PROBLEM — R15.9 INCONTINENCE OF FECES: Status: ACTIVE | Noted: 2017-03-07

## 2025-04-03 NOTE — PROGRESS NOTES
Preoperative Evaluation  [facility]  [address] [address]  Phone: [phone]  Fax: [phone]  Primary Provider: Joey Vu MD  Pre-op Performing Provider: Joey Vu MD  Apr 3, 2025             4/1/2025   Surgical Information   What procedure is being done? left foot  2nd & 3rdtmtj fusion w/calcgraft   Facility or Hospital where procedure/surgery will be performed: [facility]   Who is doing the procedure / surgery? Dr. Biggs   Date of surgery / procedure: april 18th 2025   Time of surgery / procedure: ?   Where do you plan to recover after surgery? at home with family     Fax number for surgical facility: [phone]    Assessment & Plan     The proposed surgical procedure is considered LOW risk.    Problem List Items Addressed This Visit    None  Visit Diagnoses       Primary osteoarthritis of left foot    -  Primary                    - No identified additional risk factors other than previously addressed    Antiplatelet or Anticoagulation Medication Instructions   - We reviewed the medication list and the patient is not on an antiplatelet or anticoagulation medications.    Additional Medication Instructions  On no prescription medications    Recommendation  Approval given to proceed with proposed procedure, without further diagnostic evaluation.    Subjective   Lorrie is a 75 year old, presenting for the following:  Pre-Op Exam (Left foot surgery by Dr Biggs at [facility] DOS 4/18/25)          4/3/2025     1:55 PM   Additional Questions   Roomed by Manuel ALVA     HPI: Healthy 75-year-old woman without any chronic health conditions, on no medications scheduled for a fusion procedure in her left foot for osteoarthritis.  Previously had fusion in the right foot to good effect.  Foot is aches at night and if she walks on hard surfaces.  Walking in the woods causes no problems.  No swelling.  No history of cardiopulmonary disease, surgical  bleeding, sleep apnea, snoring, anesthetic problems.  No recent illness or complaints.          4/1/2025   Pre-Op Questionnaire   Have you ever had a heart attack or stroke? No   Have you ever had surgery on your heart or blood vessels, such as a stent placement, a coronary artery bypass, or surgery on an artery in your head, neck, heart, or legs? No   Do you have chest pain with activity? No   Do you have a history of heart failure? No   Do you currently have a cold, bronchitis or symptoms of other infection? No   Do you have a cough, shortness of breath, or wheezing? No   Do you or anyone in your family have previous history of blood clots? No   Do you or does anyone in your family have a serious bleeding problem such as prolonged bleeding following surgeries or cuts? No   Have you ever had problems with anemia or been told to take iron pills? No   Have you had any abnormal blood loss such as black, tarry or bloody stools, or abnormal vaginal bleeding? No   Have you ever had a blood transfusion? No   Are you willing to have a blood transfusion if it is medically needed before, during, or after your surgery? Yes   Have you or any of your relatives ever had problems with anesthesia? No   Do you have sleep apnea, excessive snoring or daytime drowsiness? No   Do you have any artifical heart valves or other implanted medical devices like a pacemaker, defibrillator, or continuous glucose monitor? No   Do you have artificial joints? No   Are you allergic to latex? No     Health Care Directive  Patient does not have a Health Care Directive: Patient states has Advance Directive and will bring in a copy to clinic.    Preoperative Review of    reviewed - no record of controlled substances prescribed.          Patient Active Problem List    Diagnosis Date Noted    History of varicose veins 04/03/2025     Priority: Medium    Osteoarthritis of knee 04/03/2025     Priority: Medium     Evaluated by Dwight Bianchi MD, on  2008.    Bilateral      Incontinence of feces 2017     Priority: Medium     Rechargeable stimulator placed      Osteopenia 2017     Priority: Medium    History of traumatic brain injury 2016     Priority: Medium    Low grade myelodysplastic syndrome lesions (H) 2008     Priority: Medium    Uterovaginal prolapse 2008     Priority: Medium     Stimulator present        No past medical history on file.  Past Surgical History:   Procedure Laterality Date    APPENDECTOMY      no date given    C CT BONE DENSITOMETRY STUDY  2006    CHOANAL ADENIODECTOMY      no date given    COLONOSCOPY  2013    COLONOSCOPY  2008    COLONOSCOPY      COLONOSCOPY  2023    Diverticulosis    DUAL ENERGY X-RAY ABSORPT, 1+ SITES  2013    DUAL ENERGY X-RAY ABSORPT, 1+ SITES  2009    HYSTERECTOMY  1997    INTERSTIM TX 3080/3886 LEAD  2014    NEUROMA SURGERY  10/2003    OTHER SURGICAL HISTORY Right     bladder stimulatorRight low back    POSTERIOR REPAIR  2008    [place] EXCISION OF VAGINAL LESION  2008    RECTAL PROLAPSE REPAIR  2010    STRESS TEST  2006    TONSILLECTOMY      no date given     Current Outpatient Medications   Medication Sig Dispense Refill    cholecalciferol, vitamin D3, 1,000 unit tablet [CHOLECALCIFEROL, VITAMIN D3, 1,000 UNIT TABLET] Take 1,000 Units by mouth daily. Also contains coenzyme Q 10      fish oil-omega-3 fatty acids (OMEGA-3 FISH OIL) 1000 MG capsule Take 1 capsule by mouth      Multiple Vitamin (ONE-A-DAY ESSENTIAL) TABS Take 1 tablet by mouth daily      vitamin C (ASCORBIC ACID) 500 MG tablet Take 500 mg by mouth         No Known Allergies     Social History     Tobacco Use    Smoking status: Never    Smokeless tobacco: Never   Substance Use Topics    Alcohol use: No     Family History   Problem Relation Age of Onset    Heart Failure Mother          at age:73 years    Bone Cancer Father          at  "age: 75 years    Cancer Father     Colon Cancer Father     Kidney Cancer Father     Kidney failure Father     Kidney Cancer Sister     Cancer Sister     No Known Problems Brother          at age: 20 years Cause of death; car accident    No Known Problems Brother          at age: 19 years Cause of death: car accident    Diabetes Paternal Grandmother     No Known Problems Son          at age: 33 years Cause of death: car accident- truck rollover     History   Drug Use Not on file             Review of Systems  Constitutional, HEENT, cardiovascular, pulmonary, gi and gu systems are negative, except as otherwise noted.    Objective    /76 (BP Location: Right arm, Patient Position: Sitting, Cuff Size: Adult Large)   Pulse 72   Temp 98  F (36.7  C) (Tympanic)   Resp 16   Ht 1.695 m (5' 6.75\")   Wt 75.3 kg (166 lb)   SpO2 99%   BMI 26.19 kg/m     Estimated body mass index is 26.19 kg/m  as calculated from the following:    Height as of this encounter: 1.695 m (5' 6.75\").    Weight as of this encounter: 75.3 kg (166 lb).  Physical Exam  Healthy-appearing and in no distress  Eyes appear normal  HEENT exam unremarkable  Neck supple no adenopathy or thyromegaly  Lungs clear  Cardiac exam regular, no murmur, no edema, normal carotid and posterior tibial pulsations  Abdomen soft, nondistended, nontender  Alert, oriented, speech and cognition intact, cranial nerves normal, strength and gait normal  Normal mood and affect      No results for input(s): \"HGB\", \"PLT\", \"INR\", \"NA\", \"POTASSIUM\", \"CR\", \"A1C\" in the last 8760 hours.     Diagnostics  Normal CMP and CBC at [facility] 3/17/25   No EKG required for low risk surgery (cataract, skin procedure, breast biopsy, etc).    Revised Cardiac Risk Index (RCRI)  The patient has the following serious cardiovascular risks for perioperative complications:   - No serious cardiac risks = 0 points     RCRI Interpretation: 0 points: Class I (very low risk - 0.4% " complication rate)         Signed Electronically by: Joey Vu MD  A copy of this evaluation report is provided to the requesting physician.

## 2025-05-18 ENCOUNTER — HEALTH MAINTENANCE LETTER (OUTPATIENT)
Age: 75
End: 2025-05-18

## 2025-06-03 ENCOUNTER — TRANSFERRED RECORDS (OUTPATIENT)
Dept: HEALTH INFORMATION MANAGEMENT | Facility: CLINIC | Age: 75
End: 2025-06-03
Payer: COMMERCIAL

## 2025-07-01 ENCOUNTER — NURSE TRIAGE (OUTPATIENT)
Dept: FAMILY MEDICINE | Facility: CLINIC | Age: 75
End: 2025-07-01
Payer: COMMERCIAL

## 2025-07-01 NOTE — TELEPHONE ENCOUNTER
Nurse Triage SBAR    Is this a 2nd Level Triage? NO - patient agreeable to go to ED     Situation: Incoming call from patient. Sent to the High priority nurse line. Woke up this morning with swelling in her left leg. She said it was a significant difference from her other leg. She said it is puffy.     Background:  patient had foot surgery on April 18th and surgery team signed off on her and she is doing therapy    She has been doing therapy, she is on gabapentin     Assessment:     1. ONSET: First noticed it this AM         2. LOCATION: Left Leg- from below the knee to the ankle and in the foot         3. SEVERITY: More than Moderate         4. REDNESS:  denies redness         5. PAIN: the part of that leg is swollen     6. FEVER: Denies any fever          7. CAUSE: unknown         8. MEDICAL HISTORY: Surgery in the Foot in April 18th 2025        9. RECURRENT SYMPTOM: leg has never swelled like this. Foot is like a balloon        10. OTHER SYMPTOMS:   Leg feel tight, denies any chest pain or tightness       Protocol Recommended Disposition:   Go To Office Now    Recommendation: Advise patient that we could schedule an office visit but we also talked about the swelling in the leg it could possible be a DVT and that if patient needs an ultrasound the quickest way to be evaluated would be to go to the ED.   Care advise:         We also talked about possible ADS visit but patient did not want to go into the city.     Patient was agreeable to go to the ED this morning for further evacuation.       Does the patient meet one of the following criteria for ADS visit consideration? 16+ years old, with an [facility] PCP     TIP  Providers, please consider if this condition is appropriate for management at one of our Acute and Diagnostic Services sites.     If patient is a good candidate, please use dotphrase <dot>triageresponse and select Refer to ADS to document.      Reason for Disposition   Thigh, calf, or ankle swelling in  only one leg    Additional Information   Negative: Sounds like a life-threatening emergency to the triager   Negative: Chest pain   Negative: Followed an insect bite and has localized swelling (e.g., small area of puffy or swollen skin)   Negative: Followed a knee injury   Negative: Ankle injury   Negative: Pregnant with leg swelling or edema   Negative: Difficulty breathing at rest   Negative: Entire foot is cool or blue in comparison to other side   Negative: Cast on leg or ankle and has increasing pain   Negative: Can't walk or can barely stand (new-onset)   Negative: Fever and red area (or area very tender to touch)   Negative: Patient sounds very sick or weak to the triager   Negative: SEVERE swelling (e.g., swelling extends above knee, entire leg is swollen, weeping fluid)   Negative: Pregnant 20 or more weeks and sudden weight gain (i.e., > 2 lbs, 1 kg in one week)   Negative: Thigh or calf pain and only 1 side and present > 1 hour    Protocols used: Leg Swelling and Edema-A-[place]

## 2025-07-14 ENCOUNTER — TRANSFERRED RECORDS (OUTPATIENT)
Dept: HEALTH INFORMATION MANAGEMENT | Facility: CLINIC | Age: 75
End: 2025-07-14
Payer: COMMERCIAL

## 2025-07-29 ENCOUNTER — OFFICE VISIT (OUTPATIENT)
Dept: FAMILY MEDICINE | Facility: CLINIC | Age: 75
End: 2025-07-29
Payer: COMMERCIAL

## 2025-07-29 VITALS
RESPIRATION RATE: 16 BRPM | TEMPERATURE: 97.5 F | HEART RATE: 60 BPM | SYSTOLIC BLOOD PRESSURE: 138 MMHG | OXYGEN SATURATION: 98 % | DIASTOLIC BLOOD PRESSURE: 80 MMHG

## 2025-07-29 DIAGNOSIS — G57.72 COMPLEX REGIONAL PAIN SYNDROME TYPE 2 OF LEFT LOWER EXTREMITY: ICD-10-CM

## 2025-07-29 DIAGNOSIS — R07.9 EXERTIONAL CHEST PAIN: Primary | ICD-10-CM

## 2025-07-29 DIAGNOSIS — M25.551 ACUTE PAIN OF BOTH HIPS: ICD-10-CM

## 2025-07-29 DIAGNOSIS — M25.552 ACUTE PAIN OF BOTH HIPS: ICD-10-CM

## 2025-07-29 PROBLEM — G90.522 COMPLEX REGIONAL PAIN SYNDROME OF LEFT LOWER EXTREMITY: Status: ACTIVE | Noted: 2025-07-29

## 2025-07-29 PROCEDURE — 3079F DIAST BP 80-89 MM HG: CPT | Performed by: INTERNAL MEDICINE

## 2025-07-29 PROCEDURE — 93000 ELECTROCARDIOGRAM COMPLETE: CPT | Performed by: INTERNAL MEDICINE

## 2025-07-29 PROCEDURE — 99214 OFFICE O/P EST MOD 30 MIN: CPT | Performed by: INTERNAL MEDICINE

## 2025-07-29 PROCEDURE — 3075F SYST BP GE 130 - 139MM HG: CPT | Performed by: INTERNAL MEDICINE

## 2025-07-29 RX ORDER — GABAPENTIN 300 MG/1
CAPSULE ORAL
COMMUNITY

## 2025-07-29 NOTE — PROGRESS NOTES
"  Assessment & Plan     Exertional chest pain  1 week of exertional chest pressure.  Differential including ACS and pulmonary embolism particularly given her left ankle issues.  EKG is normal which is somewhat reassuring.  Patient is referred to the ED. Spoke with Dr. Casillas.  - EKG 12-lead complete w/read - Clinics    Complex regional pain syndrome  Left ankle is not tender or swollen    Acute pain of both hips  Discomfort seems suspicious for tendinopathy of the hip flexors.  No hip joint or lateral hip tenderness or pain.    BMI  Estimated body mass index is 26.19 kg/m  as calculated from the following:    Height as of 4/3/25: 1.695 m (5' 6.75\").    Weight as of 4/3/25: 75.3 kg (166 lb).           Len Anderw is a 75 year old, presenting for the following health issues:  Hip Pain (Pt c/o bilateral hip pain when walking x 2 days) and Chest Pain (Pt also c/o chest pain/tightness x 2 days)      7/29/2025     9:56 AM   Additional Questions   Roomed by Manuel RODRÍGUEZ      Patient complains of discomfort that began this morning in the anterior iliac crest area bilaterally.  No recent unusual physical activity.  No back pain or abdominal pain.  Also complains of 1 week of exertional chest pain.  It occurs only with exertion.  Walked in from her car today without experiencing it.  No fever, chills, cough, dyspnea, edema, headache, myalgia, dysuria, hematuria, diarrhea.  She is now on gabapentin 600 mg 3 times daily for complex regional pain syndrome of the left ankle after surgery lateral hip pain no shoulder pain no swollen joints.  No morning stiffness.  No rash.            Review of Systems  Constitutional, HEENT, cardiovascular, pulmonary, gi and gu systems are negative, except as otherwise noted.      Objective    /80 (BP Location: Right arm, Patient Position: Sitting, Cuff Size: Adult Regular)   Pulse 60   Temp 97.5  F (36.4  C) (Tympanic)   Resp 16   SpO2 98%   There is no height or weight on " file to calculate BMI.  Physical Exam   Healthy-appearing woman in no distress  Eyes appear normal  HEENT exam unremarkable  Neck supple no nodes or thyromegaly  Lungs clear  Cardiac exam regular, no murmur, no edema, no chest wall pain, normal carotid and posterior tibial pulsations  Abdomen soft, nondistended, nontender  Skin the anterior iliac crest area worse with resisted abduction at the hip.  Pelvis and hips otherwise nontender.  Shoulder nontender.  No swollen joints.  Thoracic and lumbar spine nontender  Alert, oriented, speech fluent, cognition intact, cranial nerves normal, strength and gait normal  Mood and affect normal    EKG - Reviewed and interpreted by me sinus bradycardia with a rate of 59, otherwise normal EKG.        Signed Electronically by: Joey Vu MD